# Patient Record
Sex: MALE | Race: WHITE | Employment: OTHER | URBAN - METROPOLITAN AREA
[De-identification: names, ages, dates, MRNs, and addresses within clinical notes are randomized per-mention and may not be internally consistent; named-entity substitution may affect disease eponyms.]

---

## 2017-01-16 DIAGNOSIS — M81.0 OSTEOPOROSIS: Primary | ICD-10-CM

## 2017-01-18 ENCOUNTER — TELEPHONE (OUTPATIENT)
Dept: FAMILY MEDICINE | Facility: CLINIC | Age: 82
End: 2017-01-18

## 2017-01-18 DIAGNOSIS — I10 ESSENTIAL HYPERTENSION WITH GOAL BLOOD PRESSURE LESS THAN 140/90: ICD-10-CM

## 2017-01-18 DIAGNOSIS — G47.00 INSOMNIA, UNSPECIFIED TYPE: ICD-10-CM

## 2017-01-18 DIAGNOSIS — E78.5 HYPERLIPIDEMIA LDL GOAL <130: Primary | ICD-10-CM

## 2017-01-18 RX ORDER — SIMVASTATIN 80 MG
80 TABLET ORAL AT BEDTIME
Qty: 90 TABLET | Refills: 1 | Status: SHIPPED | OUTPATIENT
Start: 2017-01-18 | End: 2017-10-05

## 2017-01-18 RX ORDER — LISINOPRIL 5 MG/1
5 TABLET ORAL DAILY
Qty: 90 TABLET | Refills: 0 | Status: SHIPPED | OUTPATIENT
Start: 2017-01-18 | End: 2017-10-05

## 2017-01-18 RX ORDER — ZOLPIDEM TARTRATE 10 MG/1
10 TABLET ORAL
Qty: 90 TABLET | Refills: 1 | Status: SHIPPED | OUTPATIENT
Start: 2017-01-18 | End: 2017-09-20

## 2017-01-18 RX ORDER — ALENDRONATE SODIUM 70 MG/1
TABLET ORAL
Qty: 12 TABLET | Refills: 3 | Status: SHIPPED | OUTPATIENT
Start: 2017-01-18 | End: 2017-10-05

## 2017-01-18 NOTE — TELEPHONE ENCOUNTER
Routing to provider to review and advise.   All Rx are charted as written request on 11/1/16 with additional refills. Print out given?  Dot Gomez RN

## 2017-01-18 NOTE — TELEPHONE ENCOUNTER
Pending Prescriptions:                       Disp   Refills    simvastatin (ZOCOR) 80 MG tablet          90 tab*3            Sig: Take 1 tablet (80 mg) by mouth At Bedtime    zolpidem (AMBIEN) 10 MG tablet            90 tab*1            Sig: Take 1 tablet (10 mg) by mouth nightly as needed           for sleep    lisinopril (PRINIVIL/ZESTRIL) 5 MG tablet 90 tab*3            Sig: Take 1 tablet (5 mg) by mouth daily    Call these to [facility] he forgot to ask for them  [facility] - [place], [place] - [address]. [Patient Preferred] [phone]

## 2017-01-18 NOTE — TELEPHONE ENCOUNTER
alendronate    Last Written Prescription Date: 11/1/16  Last Fill Quantity: 12, # refills: 3  [place] [facility] [place] [facility] or [facility] prescribing provider: 11/13/15 Dr. Bowles         DEXA Scan:  Last order of DX HIP/PELVIS/SPINE was found on 10/2/2012 from Orders Only on 10/2/2012     No order of DX HIP/PELVIS/SPINE W LAT FRACTION ANALYSIS is found.       CREATININE   Date Value Ref Range Status   11/01/2016 0.90 0.66 - 1.25 mg/dL Final

## 2017-01-18 NOTE — TELEPHONE ENCOUNTER
Routing refill request to provider for review/approval because:  Labs not current:  Last DEXA greater than 2 years ago.  Selene Gatica RN

## 2017-06-21 ENCOUNTER — TELEPHONE (OUTPATIENT)
Dept: SURGERY | Facility: CLINIC | Age: 82
End: 2017-06-21

## 2017-06-21 NOTE — TELEPHONE ENCOUNTER
Name of caller: Patient    Reason for Call:  QUESTIONS    Surgeon:  Dr. Durham    Recent Surgery:  No  Would like 2nd opinion on umbilical hernia.    If yes, when & what type:  N/A      Best phone number to reach pt at is: [phone]  Ok to leave a message with medical info? Yes.    Pharmacy preferred (if calling for a refill): NA

## 2017-06-21 NOTE — TELEPHONE ENCOUNTER
Discussed post op recovery with patient and his wife and surgical options that Dr. Durham does for umbilical hernias. Let patient know that this all depends on the hernia size and patients overall health and recommended a consult to discuss more. Patient verbalized understanding of this and agreed. Encouraged them to call back if they had further questions or concerns.    Ida Griffin RN BSN

## 2017-06-28 ENCOUNTER — OFFICE VISIT (OUTPATIENT)
Dept: SURGERY | Facility: CLINIC | Age: 82
End: 2017-06-28
Payer: MEDICARE

## 2017-06-28 VITALS
BODY MASS INDEX: 23.7 KG/M2 | SYSTOLIC BLOOD PRESSURE: 124 MMHG | DIASTOLIC BLOOD PRESSURE: 60 MMHG | HEIGHT: 69 IN | HEART RATE: 65 BPM | WEIGHT: 160 LBS

## 2017-06-28 DIAGNOSIS — K43.9 VENTRAL HERNIA WITHOUT OBSTRUCTION OR GANGRENE: Primary | ICD-10-CM

## 2017-06-28 PROCEDURE — 99203 OFFICE O/P NEW LOW 30 MIN: CPT | Performed by: SURGERY

## 2017-06-28 RX ORDER — CALCIUM CARBONATE 500(1250)
1 TABLET ORAL 2 TIMES DAILY
COMMUNITY

## 2017-06-28 NOTE — MR AVS SNAPSHOT
"              After Visit Summary   2017    Butch Carter    MRN: [id number]           Patient Information     Date Of Birth          10/9/1933        Visit Information        Provider Department      2017 2:00 PM Hari Durham MD [facility]       Follow-ups after your visit        Who to contact     If you have questions or need follow up information about today's clinic visit or your schedule please contact [facility] directly at [phone].  Normal or non-critical lab and imaging results will be communicated to you by Deminoshart, letter or phone within 4 business days after the clinic has received the results. If you do not hear from us within 7 days, please contact the clinic through [employer] or phone. If you have a critical or abnormal lab result, we will notify you by phone as soon as possible.  Submit refill requests through Table8 or call your pharmacy and they will forward the refill request to us. Please allow 3 business days for your refill to be completed.          Additional Information About Your Visit        MyChart Information     Table8 lets you send messages to your doctor, view your test results, renew your prescriptions, schedule appointments and more. To sign up, go to [email or web address] . Click on \"Log in\" on the left side of the screen, which will take you to the Welcome page. Then click on \"Sign up Now\" on the right side of the page.     You will be asked to enter the access code listed below, as well as some personal information. Please follow the directions to create your username and password.     Your access code is: [email or web address]  Expires: 2017  3:26 PM     Your access code will  in 90 days. If you need help or a new code, please call your [place] clinic or [phone].        Care EveryWhere ID     This is your Care EveryWhere ID. This could be used by other organizations to access " "your [facility] medical records  [id number]        Your Vitals Were     Pulse Height BMI (Body Mass Index)             65 5' 9\" (1.753 m) 23.63 kg/m2          Blood Pressure from Last 3 Encounters:   06/28/17 124/60   11/01/16 111/67   07/13/16 112/63    Weight from Last 3 Encounters:   06/28/17 160 lb (72.6 kg)   11/01/16 157 lb 9.6 oz (71.5 kg)   07/13/16 156 lb (70.8 kg)              Today, you had the following     No orders found for display       Primary Care Provider Office Phone # Fax #    Tate Hsu [phone]       [facility] [address]  [facility] [address]        Equal Access to Services     [facility]: Hadii aad freda hadasho Soomaali, waaxda luqadaha, qaybta kaalmada adeegyada, waxay kaelyn herrera . So [facility] [place] [facility] [phone].    ATENCIÓN: Si habla español, tiene a bhakta disposición servicios gratuitos de asistencia lingüística. Daphne al [phone].    We comply with applicable federal civil rights laws and [place] laws. We do not discriminate on the basis of race, color, national origin, age, disability sex, sexual orientation or gender identity.            Thank you!     Thank you for choosing [facility]  for your care. Our goal is always to provide you with excellent care. Hearing back from our patients is one way we can continue to improve our services. Please take a few minutes to complete the written survey that you may receive in the mail after your visit with us. Thank you!             Your Updated Medication List - Protect others around you: Learn how to safely use, store and throw away your medicines at www.disposemymeds.org.          This list is accurate as of: 6/28/17  3:26 PM.  Always use your most recent med list.                   Brand Name Dispense Instructions for use Diagnosis    * alendronate 70 MG tablet    FOSAMAX    12 tablet    Take 1 tablet (70 mg) by mouth every 7 days    Osteoporosis       * alendronate 70 " MG tablet    FOSAMAX    12 tablet    TAKE 1 TABLET (70MG) BY MOUTH EVERY 7 DAYS    Osteoporosis       aspirin 81 MG tablet     100 tablet    Take 1 tablet (81 mg) by mouth daily *    Hypertension goal BP (blood pressure) < 140/90       B-12 PO           calcium carbonate 1250 MG tablet    OS-SUSHIL 500 mg [place]. [place]     Take 1 tablet by mouth 2 times daily        CENTRUM SILVER ADULT 50+ PO           ferrous sulfate 325 (65 FE) MG tablet    IRON    180 tablet    Take 1 tablet (325 mg) by mouth 2 times daily    Iron deficiency anemia, unspecified iron deficiency anemia type       Fish Oil 1000 MG Cpdr      Take by mouth 2 times daily        GLUCOSAMINE CHONDR 1500 COMPLX PO           lisinopril 5 MG tablet    PRINIVIL/ZESTRIL    90 tablet    Take 1 tablet (5 mg) by mouth daily    Essential hypertension with goal blood pressure less than 140/90       LUTEIN      daily        sildenafil 100 MG cap/tab    REVATIO/VIAGRA    20 tablet    Take 0.5-1 tablets ( mg) by mouth daily as needed for erectile dysfunction Take 30 min to 4 hours before intercourse.  Never use with nitroglycerin, terazosin or doxazosin.    Erectile dysfunction, unspecified erectile dysfunction type       simvastatin 80 MG tablet    ZOCOR    90 tablet    Take 1 tablet (80 mg) by mouth At Bedtime    Hyperlipidemia LDL goal <130       terazosin 5 MG capsule    HYTRIN    90 capsule    Take 1 capsule (5 mg) by mouth At Bedtime    Benign non-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       VITAMIN D (CHOLECALCIFEROL) PO      Take by mouth daily        zolpidem 10 MG tablet    AMBIEN    90 tablet    Take 1 tablet (10 mg) by mouth nightly as needed for sleep    Insomnia, unspecified type       * Notice:  This list has 2 medication(s) that are the same as other medications prescribed for you. Read the directions carefully, and ask your doctor or other care provider to review them with you.

## 2017-06-28 NOTE — LETTER
"2017      RE:  Butch Carter-:  [birth year]    HISTORY OF PRESENT ILLNESS:  Butch Carter is a 83 year old male who is seen in consultation at the request of Dr. Hsu for evaluation of newly developed umbilical hernia.  Mr. Carter has recently started an exercise program and while doing some abdominal crunches, notice a pain in the umbilical region.  Later he noticed a bulge in that area, there was some pain, he was able to reduce the hernia and felt better.  He has back down from his usual physical activities as this hernia provides enough discomfort to alter his behavior.  He is here today with his wife to discuss repair.     REVIEW OF SYSTEMS:  Constitutional:  Negative for chills, fatigue, fever and weight change.  Eyes:  Negative for blurred vision.  ENT:  Negative for ENT pain.  Cardiovascular:  Negative for chest pain, palpitations.  Respiratory:  Negative for cough, dyspnea and hemoptysis.  Gastrointestinal:  See HPI.  No GI function changes or alterations.  Musculoskeletal:  Negative for arthralgias, back pain and myalgias.     Vitals: /60  Pulse 65  Ht 5' 9\" (1.753 m)  Wt 160 lb (72.6 kg)  BMI 23.63 kg/m2  BMI= Body mass index is 23.63 kg/(m^2).     EXAM:  GENERAL: healthy, alert and no distress   HEENT: moist mucus membranes, no scleral icterus  CARDIOVASCULAR:  RRR, No JVD  RESPIRATORY: non labored breathing  NECK: Neck supple. No noticeable masses.  ABDOMEN: soft, nontender, nondistended, reducible ventral hernia at umbilicus.  Extremities: warm and well perfused, no edema  SKIN: No suspicious lesions or rashes     LABS/Imaging: none to review     ASSESSMENT:  Butch Carter suffers from Umbilical ventral hernia.     PLAN:  Open repair with mesh under intravenous sedation as outpatient.     Butch Carter understands the risk, benefits, hopeful outcomes, and possible complications, both in the short and in the long term.  All his questions answered, he will like to proceed with the propose " procedure in the near future.     It is my pleasure to participate in the care of Butch Carter. Thank you for this consultation.      If you have any questions please give me a call.     Best regards,    Hari Durham MD

## 2017-06-30 NOTE — PROGRESS NOTES
[facility] Consultation    CHIEF COMPLAINT:  Chief Complaint   Patient presents with     Consult     umbilical hernia       HISTORY OF PRESENT ILLNESS:  Butch Carter is a 83 year old male who is seen in consultation at the request of Dr. Hsu for evaluation of newly developed umbilical hernia.  Mr. Carter has recently started an exercise program and while doing some abdominal crunches, notice a pain in the umbilical region.  Later he noticed a bulge in that area, there was some pain, he was able to reduce the hernia and felt better.  He has back down from his usual physical activities as this hernia provides enough discomfort to alter his behavior.  He is here today with his wife to discuss repair.    REVIEW OF SYSTEMS:  Constitutional:  Negative for chills, fatigue, fever and weight change.  Eyes:  Negative for blurred vision.  ENT:  Negative for ENT pain.  Cardiovascular:  Negative for chest pain, palpitations.  Respiratory:  Negative for cough, dyspnea and hemoptysis.  Gastrointestinal:  See HPI.  No GI function changes or alterations.  Musculoskeletal:  Negative for arthralgias, back pain and myalgias.    Past Medical History:   Diagnosis Date     Hyperlipidemia LDL goal <130 9/21/2011     Hypertension goal BP (blood pressure) < 140/90 9/21/2011       Past Surgical History:   Procedure Laterality Date     SURGICAL HISTORY OF -   5/04    Double Hernia     SURGICAL HISTORY OF -   2013    cataract OD       Family History   Problem Relation Age of Onset     CANCER Mother      Hypertension Mother        Social History   Substance Use Topics     Smoking status: Never Smoker     Smokeless tobacco: Never Used     Alcohol use No       Patient Active Problem List   Diagnosis     Hyperlipidemia LDL goal <130     Osteoporosis     Advanced directives, counseling/discussion     ED (erectile dysfunction)     Benign non-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     Iron deficiency  "anemia     Insomnia, unspecified type     Essential hypertension with goal blood pressure less than 140/90     Erectile dysfunction, unspecified erectile dysfunction type       No Known Allergies    Current Outpatient Prescriptions   Medication Sig Dispense Refill     Cyanocobalamin (B-12 PO)        VITAMIN D, CHOLECALCIFEROL, PO Take by mouth daily       calcium carbonate (OS-SUSHIL 500 MG [place]. [place]) 1250 MG tablet Take 1 tablet by mouth 2 times daily       alendronate (FOSAMAX) 70 MG tablet TAKE 1 TABLET (70MG) BY MOUTH EVERY 7 DAYS 12 tablet 3     simvastatin (ZOCOR) 80 MG tablet Take 1 tablet (80 mg) by mouth At Bedtime 90 tablet 1     zolpidem (AMBIEN) 10 MG tablet Take 1 tablet (10 mg) by mouth nightly as needed for sleep 90 tablet 1     ferrous sulfate (IRON) 325 (65 FE) MG tablet Take 1 tablet (325 mg) by mouth 2 times daily 180 tablet 3     terazosin (HYTRIN) 5 MG capsule Take 1 capsule (5 mg) by mouth At Bedtime 90 capsule 3     alendronate (FOSAMAX) 70 MG tablet Take 1 tablet (70 mg) by mouth every 7 days 12 tablet 3     sildenafil (VIAGRA) 100 MG tablet Take 0.5-1 tablets ( mg) by mouth daily as needed for erectile dysfunction Take 30 min to 4 hours before intercourse.  Never use with nitroglycerin, terazosin or doxazosin. 20 tablet 5     aspirin 81 MG tablet Take 1 tablet (81 mg) by mouth daily * 100 tablet 1     Multiple Vitamins-Minerals (CENTRUM SILVER ADULT 50+ PO)        Glucosamine-Chondroit-Vit C-Mn (GLUCOSAMINE CHONDR 1500 COMPLX PO)        Omega-3 Fatty Acids (FISH OIL) 1000 MG CPDR Take by mouth 2 times daily       LUTEIN daily       lisinopril (PRINIVIL/ZESTRIL) 5 MG tablet Take 1 tablet (5 mg) by mouth daily (Patient not taking: Reported on 6/28/2017) 90 tablet 0       Vitals: /60  Pulse 65  Ht 5' 9\" (1.753 m)  Wt 160 lb (72.6 kg)  BMI 23.63 kg/m2  BMI= Body mass index is 23.63 kg/(m^2).    EXAM:  GENERAL: healthy, alert and no distress   HEENT: moist mucus membranes, no scleral " icterus  CARDIOVASCULAR:  RRR, No JVD  RESPIRATORY: non labored breathing  NECK: Neck supple. No noticeable masses.  ABDOMEN: soft, nontender, nondistended, reducible ventral hernia at umbilicus.  Extremities: warm and well perfused, no edema  SKIN: No suspicious lesions or rashes    LABS/Imaging: none to review    ASSESSMENT:  Butch Carter suffers from Umbilical ventral hernia.    PLAN:  Open repair with mesh under intravenous sedation as outpatient.    Butch Carter understands the risk, benefits, hopeful outcomes, and possible complications, both in the short and in the long term.  All his questions answered, he will like to proceed with the propose procedure in the near future.    It is my pleasure to participate in the care of Butch Carter. Thank you for this consultation.     If you have any questions please give me a call.    Best regards,  Hari Durham MD    Please route or send letter to:  Primary Care Provider (PCP), Referring Provider and Include Progress Note    Total time with patient visit: 30 minutes more than half spent in counseling, explanation of procedures and coordination of care.

## 2017-07-18 ENCOUNTER — OFFICE VISIT (OUTPATIENT)
Dept: FAMILY MEDICINE | Facility: CLINIC | Age: 82
End: 2017-07-18
Payer: MEDICARE

## 2017-07-18 VITALS
TEMPERATURE: 97.2 F | HEART RATE: 75 BPM | HEIGHT: 69 IN | OXYGEN SATURATION: 95 % | SYSTOLIC BLOOD PRESSURE: 108 MMHG | BODY MASS INDEX: 23.99 KG/M2 | WEIGHT: 162 LBS | DIASTOLIC BLOOD PRESSURE: 63 MMHG

## 2017-07-18 DIAGNOSIS — Z01.818 PREOP GENERAL PHYSICAL EXAM: Primary | ICD-10-CM

## 2017-07-18 LAB
ANION GAP SERPL CALCULATED.3IONS-SCNC: 4 MMOL/L (ref 3–14)
BUN SERPL-MCNC: 16 MG/DL (ref 7–30)
CALCIUM SERPL-MCNC: 8.6 MG/DL (ref 8.5–10.1)
CHLORIDE SERPL-SCNC: 106 MMOL/L (ref 94–109)
CO2 SERPL-SCNC: 30 MMOL/L (ref 20–32)
CREAT SERPL-MCNC: 0.81 MG/DL (ref 0.66–1.25)
ERYTHROCYTE [DISTWIDTH] IN BLOOD BY AUTOMATED COUNT: 15 % (ref 10–15)
GFR SERPL CREATININE-BSD FRML MDRD: NORMAL ML/MIN/1.7M2
GLUCOSE SERPL-MCNC: 96 MG/DL (ref 70–99)
HCT VFR BLD AUTO: 36.1 % (ref 40–53)
HGB BLD-MCNC: 11.8 G/DL (ref 13.3–17.7)
MCH RBC QN AUTO: 30.6 PG (ref 26.5–33)
MCHC RBC AUTO-ENTMCNC: 32.7 G/DL (ref 31.5–36.5)
MCV RBC AUTO: 94 FL (ref 78–100)
PLATELET # BLD AUTO: 189 10E9/L (ref 150–450)
POTASSIUM SERPL-SCNC: 4.1 MMOL/L (ref 3.4–5.3)
RBC # BLD AUTO: 3.85 10E12/L (ref 4.4–5.9)
SODIUM SERPL-SCNC: 140 MMOL/L (ref 133–144)
WBC # BLD AUTO: 6.4 10E9/L (ref 4–11)

## 2017-07-18 PROCEDURE — 99214 OFFICE O/P EST MOD 30 MIN: CPT | Performed by: FAMILY MEDICINE

## 2017-07-18 PROCEDURE — 93000 ELECTROCARDIOGRAM COMPLETE: CPT | Performed by: FAMILY MEDICINE

## 2017-07-18 PROCEDURE — 85027 COMPLETE CBC AUTOMATED: CPT | Performed by: FAMILY MEDICINE

## 2017-07-18 PROCEDURE — 36415 COLL VENOUS BLD VENIPUNCTURE: CPT | Performed by: FAMILY MEDICINE

## 2017-07-18 PROCEDURE — 80048 BASIC METABOLIC PNL TOTAL CA: CPT | Performed by: FAMILY MEDICINE

## 2017-07-18 NOTE — NURSING NOTE
"Chief Complaint   Patient presents with     Pre-Op Exam       Initial /63 (BP Location: Left arm, Patient Position: Chair, Cuff Size: Adult Regular)  Pulse 75  Temp 97.2  F (36.2  C) (Oral)  Ht 5' 9\" (1.753 m)  Wt 162 lb (73.5 kg)  SpO2 95%  BMI 23.92 kg/m2 Estimated body mass index is 23.92 kg/(m^2) as calculated from the following:    Height as of this encounter: 5' 9\" (1.753 m).    Weight as of this encounter: 162 lb (73.5 kg).  Medication Reconciliation: complete     Nettie Coleman MA      "

## 2017-07-18 NOTE — MR AVS SNAPSHOT
After Visit Summary   7/18/2017    Butch Carter    MRN: [id number]           Patient Information     Date Of Birth          10/9/1933        Visit Information        Provider Department      7/18/2017 10:40 AM Tate Hsu MD [facility]        Today's Diagnoses     Preop general physical exam    -  1      Care Instructions      Before Your Surgery      Call your surgeon if there is any change in your health. This includes signs of a cold or flu (such as a sore throat, runny nose, cough, rash or fever).    Do not smoke, drink alcohol or take over the counter medicine (unless your surgeon or primary care doctor tells you to) for the 24 hours before and after surgery.    If you take prescribed drugs: Follow your doctor s orders about which medicines to take and which to stop until after surgery.    Eating and drinking prior to surgery: follow the instructions from your surgeon    Take a shower or bath the night before surgery. Use the soap your surgeon gave you to gently clean your skin. If you do not have soap from your surgeon, use your regular soap. Do not shave or scrub the surgery site.  Wear clean pajamas and have clean sheets on your bed.     How to contact your care team providers:    [facility]  [phone]  Pharmacy [phone]    MITZI Sol[address] Dr., Dr., Dr., PA-C    Team RN: Leeroy GREENWOOD    Clinic hours  M-Th 7am-7pm   Fri 7am-5pm.   Urgent care M-F 11am-9pm,   Sat/sun 9am-5pm.  Pharmacy M-F 8:00am-8pm Sat/sun 9am-5pm.     All password changes, disabled accounts, or ID changes in [employer]/MyHealth will be done by our Access Services Department.   If you need help with your account or password, call: [phone]. Clinic staff no longer has the ability to change passwords.                         Follow-ups after your visit        Your next 10 appointments already  "scheduled     Aug 02, 2017   Procedure with Hari Durham MD   [facility] (--)    [address]  [place] [address] [phone]            Aug 02, 2017  9:00 AM CDT   [facility] Same Day Surgery with Hari Durham MD   [facility] ([facility])    [facility] ([facility])   [phone]            Aug 16, 2017 12:00 PM CDT   Post Op with Hari Durham MD   [facility] [place] ([facility] [place])    [address]  [place] [address] [phone]              Who to contact     If you have questions or need follow up information about today's clinic visit or your schedule please contact [facility] directly at [phone].  Normal or non-critical lab and imaging results will be communicated to you by [employer], letter or phone within 4 business days after the clinic has received the results. If you do not hear from us within 7 days, please contact the clinic through [employer] or phone. If you have a critical or abnormal lab result, we will notify you by phone as soon as possible.  Submit refill requests through [employer] or call your pharmacy and they will forward the refill request to us. Please allow 3 business days for your refill to be completed.          Additional Information About Your Visit        [employer] Information     [employer] lets you send messages to your doctor, view your test results, renew your prescriptions, schedule appointments and more. To sign up, go to [email or web address] . Click on \"Log in\" on the left side of the screen, which will take you to the Welcome page. Then click on \"Sign up Now\" on the right side of the page.     You will be asked to enter the access code listed below, as well as some personal information. Please follow the directions to create your username and password.     Your access code is: " [email or web address]  Expires: 2017  3:26 PM     Your access code will  in 90 days. If you need help or a new code, please call your [facility]3] or [phone].        Care EveryWhere ID     This is your Care EveryWhere ID. This could be used by other organizations to access your [facility] medical records  [id number]        Your Vitals Were     Pulse Temperature Height Pulse Oximetry BMI (Body Mass Index)       75 97.2  F (36.2  C) (Oral) 5' 9\" (1.753 m) 95% 23.92 kg/m2        Blood Pressure from Last 3 Encounters:   17 108/63   17 124/60   16 111/67    Weight from Last 3 Encounters:   17 162 lb (73.5 kg)   17 160 lb (72.6 kg)   16 157 lb 9.6 oz (71.5 kg)              We Performed the Following     Basic metabolic panel     CBC with platelets     EKG 12-lead complete w/read - Clinics        Primary Care Provider Office Phone # Fax #    Tate Hsu [phone]       [facility] [address]  [facility] [address]        Equal Access to Services     KARIN GIBBS : Hadii aad ku hadasho Soomaali, waaxda luqadaha, qaybta kaalmada adeegyada, waxay idiin hayerynn hetal alberto laron . So [facility] [phone].    ATENCIÓN: Si habla español, tiene a bhakta disposición servicios gratuitos de asistencia lingüística. Llame al [phone].    We comply with applicable federal civil rights laws and [place] laws. We do not discriminate on the basis of race, color, national origin, age, disability sex, sexual orientation or gender identity.            Thank you!     Thank you for choosing [facility]  for your care. Our goal is always to provide you with excellent care. Hearing back from our patients is one way we can continue to improve our services. Please take a few minutes to complete the written survey that you may receive in the mail after your visit with us. Thank you!             Your Updated Medication List - Protect others around " you: Learn how to safely use, store and throw away your medicines at www.disposemymeds.org.          This list is accurate as of: 7/18/17 11:06 AM.  Always use your most recent med list.                   Brand Name Dispense Instructions for use Diagnosis    alendronate 70 MG tablet    FOSAMAX    12 tablet    TAKE 1 TABLET (70MG) BY MOUTH EVERY 7 DAYS    Osteoporosis       aspirin 81 MG tablet     100 tablet    Take 1 tablet (81 mg) by mouth daily *    Hypertension goal BP (blood pressure) < 140/90       B-12 PO           calcium carbonate 1250 MG tablet    OS-SUSHIL 500 mg [place]. Ca     Take 1 tablet by mouth 2 times daily        CENTRUM SILVER ADULT 50+ PO           ferrous sulfate 325 (65 FE) MG tablet    IRON    180 tablet    Take 1 tablet (325 mg) by mouth 2 times daily    Iron deficiency anemia, unspecified iron deficiency anemia type       Fish Oil 1000 MG Cpdr      Take by mouth 2 times daily        GLUCOSAMINE CHONDR 1500 COMPLX PO           lisinopril 5 MG tablet    PRINIVIL/ZESTRIL    90 tablet    Take 1 tablet (5 mg) by mouth daily    Essential hypertension with goal blood pressure less than 140/90       LUTEIN      daily        sildenafil 100 MG cap/tab    REVATIO/VIAGRA    20 tablet    Take 0.5-1 tablets ( mg) by mouth daily as needed for erectile dysfunction Take 30 min to 4 hours before intercourse.  Never use with nitroglycerin, terazosin or doxazosin.    Erectile dysfunction, unspecified erectile dysfunction type       simvastatin 80 MG tablet    ZOCOR    90 tablet    Take 1 tablet (80 mg) by mouth At Bedtime    Hyperlipidemia LDL goal <130       terazosin 5 MG capsule    HYTRIN    90 capsule    Take 1 capsule (5 mg) by mouth At Bedtime    Benign non-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       VITAMIN D (CHOLECALCIFEROL) PO      Take by mouth daily        zolpidem 10 MG tablet    AMBIEN    90 tablet    Take 1 tablet (10 mg) by mouth nightly as needed for sleep     Insomnia, unspecified type

## 2017-07-18 NOTE — PROGRESS NOTES
[address] [address]  [phone]  Dept: [phone]    PRE-OP EVALUATION:  Today's date: 2017    Butch Carter (: 10/9/1933) presents for pre-operative evaluation assessment as requested by Dr. Durham.  He requires evaluation and anesthesia risk assessment prior to undergoing surgery/procedure for treatment of umbilical hernia.  Proposed procedure: HERNIORRHAPHY VENTRAL    Date of Surgery/ Procedure: 17  Time of Surgery/ Procedure: 9 am  H[facility]Surgical [facility]: [facility]  Fax number for surgical facility: does not have  Primary Physician: Tate Hsu  Type of Anesthesia Anticipated: General    Patient has a Health Care Directive or Living Will:  NO    1. NO - Do you have a history of heart attack, stroke, stent, bypass or surgery on an artery in the head, neck, heart or legs?  2. NO - Do you ever have any pain or discomfort in your chest?  3. NO - Do you have a history of  Heart Failure?  4. NO - Are you troubled by shortness of breath when: walking on the level, up a slight hill or at night?  5. NO - Do you currently have a cold, bronchitis or other respiratory infection?  6. NO - Do you have a cough, shortness of breath or wheezing?  7. NO - Do you sometimes get pains in the calves of your legs when you walk?  8. NO - Do you or anyone in your family have previous history of blood clots?  9. NO - Do you or does anyone in your family have a serious bleeding problem such as prolonged bleeding following surgeries or cuts?  10. YES - HAVE YOU EVER HAD PROBLEMS WITH ANEMIA OR BEEN TOLD TO TAKE IRON PILLS?   11. NO - Have you had any abnormal blood loss such as black, tarry or bloody stools, or abnormal vaginal bleeding?  12. NO - Have you ever had a blood transfusion?  13. NO - Have you or any of your relatives ever had problems with anesthesia?  14. NO - Do you have sleep apnea, excessive snoring or daytime drowsiness?  15. NO - Do  you have any prosthetic heart valves?  16. NO - Do you have prosthetic joints?  17. NO - Is there any chance that you may be pregnant?      HPI:                                                      Brief HPI related to upcoming procedure: h/o umbilical hernia with some discomfort. Patient would like to proceed with procedure.      See problem list for active medical problems.  Problems all longstanding and stable, except as noted/documented.  See ROS for pertinent symptoms related to these conditions.                                                                                                  .    MEDICAL HISTORY:                                                      Patient Active Problem List    Diagnosis Date Noted     Erectile dysfunction, unspecified erectile dysfunction type 11/01/2016     Priority: Medium     Essential hypertension with goal blood pressure less than 140/90 07/13/2016     Priority: Medium     Insomnia, unspecified type 06/01/2016     Priority: Medium     Benign non-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 11/13/2015     Priority: Medium     Iron deficiency anemia 11/13/2015     Priority: Medium     ED (erectile dysfunction) 05/17/2013     Priority: Medium     Advanced directives, counseling/discussion 09/25/2012     Priority: Medium     Discussed advance care planning with patient; information given to patient to review. 9/25/2012     ZAYNAB Goodman MA           Hyperlipidemia LDL goal <130 09/21/2011     Priority: Medium     Osteoporosis 09/21/2011     Priority: Medium      Past Medical History:   Diagnosis Date     Hyperlipidemia LDL goal <130 9/21/2011     Hypertension goal BP (blood pressure) < 140/90 9/21/2011     Past Surgical History:   Procedure Laterality Date     SURGICAL HISTORY OF -   5/04    Double Hernia     SURGICAL HISTORY OF -   2013    cataract OD     Current Outpatient Prescriptions   Medication Sig Dispense Refill     Cyanocobalamin (B-12 PO)         VITAMIN D, CHOLECALCIFEROL, PO Take by mouth daily       calcium carbonate (OS-SUSHIL 500 MG [place]. CA) 1250 MG tablet Take 1 tablet by mouth 2 times daily       alendronate (FOSAMAX) 70 MG tablet TAKE 1 TABLET (70MG) BY MOUTH EVERY 7 DAYS 12 tablet 3     simvastatin (ZOCOR) 80 MG tablet Take 1 tablet (80 mg) by mouth At Bedtime 90 tablet 1     zolpidem (AMBIEN) 10 MG tablet Take 1 tablet (10 mg) by mouth nightly as needed for sleep 90 tablet 1     lisinopril (PRINIVIL/ZESTRIL) 5 MG tablet Take 1 tablet (5 mg) by mouth daily 90 tablet 0     ferrous sulfate (IRON) 325 (65 FE) MG tablet Take 1 tablet (325 mg) by mouth 2 times daily 180 tablet 3     terazosin (HYTRIN) 5 MG capsule Take 1 capsule (5 mg) by mouth At Bedtime 90 capsule 3     sildenafil (VIAGRA) 100 MG tablet Take 0.5-1 tablets ( mg) by mouth daily as needed for erectile dysfunction Take 30 min to 4 hours before intercourse.  Never use with nitroglycerin, terazosin or doxazosin. 20 tablet 5     aspirin 81 MG tablet Take 1 tablet (81 mg) by mouth daily * 100 tablet 1     Multiple Vitamins-Minerals (CENTRUM SILVER ADULT 50+ PO)        Glucosamine-Chondroit-Vit C-Mn (GLUCOSAMINE CHONDR 1500 COMPLX PO)        Omega-3 Fatty Acids (FISH OIL) 1000 MG CPDR Take by mouth 2 times daily       LUTEIN daily       [DISCONTINUED] alendronate (FOSAMAX) 70 MG tablet Take 1 tablet (70 mg) by mouth every 7 days 12 tablet 3     OTC products: Aspirin    No Known Allergies   Latex Allergy: NO    Social History   Substance Use Topics     Smoking status: Never Smoker     Smokeless tobacco: Never Used     Alcohol use No     History   Drug Use No       REVIEW OF SYSTEMS:                                                    C: NEGATIVE for fever, chills, change in weight  E/M: NEGATIVE for ear, mouth and throat problems  R: NEGATIVE for significant cough or SOB  CV: NEGATIVE for chest pain, palpitations or peripheral edema    EXAM:                                                   "  /63 (BP Location: Left arm, Patient Position: Chair, Cuff Size: Adult Regular)  Pulse 75  Temp 97.2  F (36.2  C) (Oral)  Ht 5' 9\" (1.753 m)  Wt 162 lb (73.5 kg)  SpO2 95%  BMI 23.92 kg/m2  GENERAL APPEARANCE: healthy, alert and no distress  HENT: ear canals and TM's normal and nose and mouth without ulcers or lesions  RESP: lungs clear to auscultation - no rales, rhonchi or wheezes  CV: regular rate and rhythm, normal S1 S2, no S3 or S4 and no murmur, click or rub   ABDOMEN: soft, nontender, no HSM or masses and bowel sounds normal  NEURO: Normal strength and tone, sensory exam grossly normal, mentation intact and speech normal    DIAGNOSTICS:                                                    EKG: appears normal, NSR, normal axis, normal intervals, no acute ST/T changes c/w ischemia, no LVH by voltage criteria, unchanged from previous tracings    Recent Labs   Lab Test  11/01/16   0741  07/13/16   0738  06/01/16   0908   HGB  12.1*  11.9*  11.9*   PLT  198  177  180   NA  142   --   141   POTASSIUM  4.1   --   4.3   CR  0.90   --   0.85        IMPRESSION:                                                    Reason for surgery/procedure: ventral hernia repair  Diagnosis/reason for consult: preop clearance    The proposed surgical procedure is considered LOW risk.    REVISED CARDIAC RISK INDEX  The patient has the following serious cardiovascular risks for perioperative complications such as (MI, PE, VFib and 3  AV Block):  No serious cardiac risks  INTERPRETATION: 0 risks: Class I (very low risk - 0.4% complication rate)    The patient has the following additional risks for perioperative complications:  No identified additional risks    No diagnosis found.    RECOMMENDATIONS:                                                      --Patient is to take all scheduled medications on the day of surgery EXCEPT for modifications listed below.    Anticoagulant or Antiplatelet Medication Use  ASPIRIN: Discontinue " ASA 7-10 days prior to procedure to reduce bleeding risk.  It should be resumed post-operatively.        APPROVAL GIVEN to proceed with proposed procedure, without further diagnostic evaluation       Signed Electronically by: Tate Hsu MD, MD    Copy of this evaluation report is provided to requesting physician.    [place] Preop Guidelines

## 2017-07-28 NOTE — H&P (VIEW-ONLY)
[address] [address]  [phone]  Dept: [phone]    PRE-OP EVALUATION:  Today's date: 2017    Butch Carter (: 10/9/1933) presents for pre-operative evaluation assessment as requested by Dr. Durham.  He requires evaluation and anesthesia risk assessment prior to undergoing surgery/procedure for treatment of umbilical hernia.  Proposed procedure: HERNIORRHAPHY VENTRAL    Date of Surgery/ Procedure: 17  Time of Surgery/ Procedure: 9 am  Hospital/Surgical Facility: [facility]  Fax number for surgical facility: does not have  Primary Physician: Tate Hsu  Type of Anesthesia Anticipated: General    Patient has a Health Care Directive or Living Will:  NO    1. NO - Do you have a history of heart attack, stroke, stent, bypass or surgery on an artery in the head, neck, heart or legs?  2. NO - Do you ever have any pain or discomfort in your chest?  3. NO - Do you have a history of  Heart Failure?  4. NO - Are you troubled by shortness of breath when: walking on the level, up a slight hill or at night?  5. NO - Do you currently have a cold, bronchitis or other respiratory infection?  6. NO - Do you have a cough, shortness of breath or wheezing?  7. NO - Do you sometimes get pains in the calves of your legs when you walk?  8. NO - Do you or anyone in your family have previous history of blood clots?  9. NO - Do you or does anyone in your family have a serious bleeding problem such as prolonged bleeding following surgeries or cuts?  10. YES - HAVE YOU EVER HAD PROBLEMS WITH ANEMIA OR BEEN TOLD TO TAKE IRON PILLS?   11. NO - Have you had any abnormal blood loss such as black, tarry or bloody stools, or abnormal vaginal bleeding?  12. NO - Have you ever had a blood transfusion?  13. NO - Have you or any of your relatives ever had problems with anesthesia?  14. NO - Do you have sleep apnea, excessive snoring or daytime drowsiness?  15. NO - Do  you have any prosthetic heart valves?  16. NO - Do you have prosthetic joints?  17. NO - Is there any chance that you may be pregnant?      HPI:                                                      Brief HPI related to upcoming procedure: h/o umbilical hernia with some discomfort. Patient would like to proceed with procedure.      See problem list for active medical problems.  Problems all longstanding and stable, except as noted/documented.  See ROS for pertinent symptoms related to these conditions.                                                                                                  .    MEDICAL HISTORY:                                                      Patient Active Problem List    Diagnosis Date Noted     Erectile dysfunction, unspecified erectile dysfunction type 11/01/2016     Priority: Medium     Essential hypertension with goal blood pressure less than 140/90 07/13/2016     Priority: Medium     Insomnia, unspecified type 06/01/2016     Priority: Medium     Benign non-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 11/13/2015     Priority: Medium     Iron deficiency anemia 11/13/2015     Priority: Medium     ED (erectile dysfunction) 05/17/2013     Priority: Medium     Advanced directives, counseling/discussion 09/25/2012     Priority: Medium     Discussed advance care planning with patient; information given to patient to review. 9/25/2012     ZAYNAB Goodman MA           Hyperlipidemia LDL goal <130 09/21/2011     Priority: Medium     Osteoporosis 09/21/2011     Priority: Medium      Past Medical History:   Diagnosis Date     Hyperlipidemia LDL goal <130 9/21/2011     Hypertension goal BP (blood pressure) < 140/90 9/21/2011     Past Surgical History:   Procedure Laterality Date     SURGICAL HISTORY OF -   5/04    Double Hernia     SURGICAL HISTORY OF -   2013    cataract OD     Current Outpatient Prescriptions   Medication Sig Dispense Refill     Cyanocobalamin (B-12 PO)         VITAMIN D, CHOLECALCIFEROL, PO Take by mouth daily       calcium carbonate (OS-SUSHIL 500 MG [place]) 1250 MG tablet Take 1 tablet by mouth 2 times daily       alendronate (FOSAMAX) 70 MG tablet TAKE 1 TABLET (70MG) BY MOUTH EVERY 7 DAYS 12 tablet 3     simvastatin (ZOCOR) 80 MG tablet Take 1 tablet (80 mg) by mouth At Bedtime 90 tablet 1     zolpidem (AMBIEN) 10 MG tablet Take 1 tablet (10 mg) by mouth nightly as needed for sleep 90 tablet 1     lisinopril (PRINIVIL/ZESTRIL) 5 MG tablet Take 1 tablet (5 mg) by mouth daily 90 tablet 0     ferrous sulfate (IRON) 325 (65 FE) MG tablet Take 1 tablet (325 mg) by mouth 2 times daily 180 tablet 3     terazosin (HYTRIN) 5 MG capsule Take 1 capsule (5 mg) by mouth At Bedtime 90 capsule 3     sildenafil (VIAGRA) 100 MG tablet Take 0.5-1 tablets ( mg) by mouth daily as needed for erectile dysfunction Take 30 min to 4 hours before intercourse.  Never use with nitroglycerin, terazosin or doxazosin. 20 tablet 5     aspirin 81 MG tablet Take 1 tablet (81 mg) by mouth daily * 100 tablet 1     Multiple Vitamins-Minerals (CENTRUM SILVER ADULT 50+ PO)        Glucosamine-Chondroit-Vit C-Mn (GLUCOSAMINE CHONDR 1500 COMPLX PO)        Omega-3 Fatty Acids (FISH OIL) 1000 MG CPDR Take by mouth 2 times daily       LUTEIN daily       [DISCONTINUED] alendronate (FOSAMAX) 70 MG tablet Take 1 tablet (70 mg) by mouth every 7 days 12 tablet 3     OTC products: Aspirin    No Known Allergies   Latex Allergy: NO    Social History   Substance Use Topics     Smoking status: Never Smoker     Smokeless tobacco: Never Used     Alcohol use No     History   Drug Use No       REVIEW OF SYSTEMS:                                                    C: NEGATIVE for fever, chills, change in weight  E/M: NEGATIVE for ear, mouth and throat problems  R: NEGATIVE for significant cough or SOB  CV: NEGATIVE for chest pain, palpitations or peripheral edema    EXAM:                                                   "  /63 (BP Location: Left arm, Patient Position: Chair, Cuff Size: Adult Regular)  Pulse 75  Temp 97.2  F (36.2  C) (Oral)  Ht 5' 9\" (1.753 m)  Wt 162 lb (73.5 kg)  SpO2 95%  BMI 23.92 kg/m2  GENERAL APPEARANCE: healthy, alert and no distress  HENT: ear canals and TM's normal and nose and mouth without ulcers or lesions  RESP: lungs clear to auscultation - no rales, rhonchi or wheezes  CV: regular rate and rhythm, normal S1 S2, no S3 or S4 and no murmur, click or rub   ABDOMEN: soft, nontender, no HSM or masses and bowel sounds normal  NEURO: Normal strength and tone, sensory exam grossly normal, mentation intact and speech normal    DIAGNOSTICS:                                                    EKG: appears normal, NSR, normal axis, normal intervals, no acute ST/T changes c/w ischemia, no LVH by voltage criteria, unchanged from previous tracings    Recent Labs   Lab Test  11/01/16   0741  07/13/16   0738  06/01/16   0908   HGB  12.1*  11.9*  11.9*   PLT  198  177  180   NA  142   --   141   POTASSIUM  4.1   --   4.3   CR  0.90   --   0.85        IMPRESSION:                                                    Reason for surgery/procedure: ventral hernia repair  Diagnosis/reason for consult: preop clearance    The proposed surgical procedure is considered LOW risk.    REVISED CARDIAC RISK INDEX  The patient has the following serious cardiovascular risks for perioperative complications such as (MI, PE, VFib and 3  AV Block):  No serious cardiac risks  INTERPRETATION: 0 risks: Class I (very low risk - 0.4% complication rate)    The patient has the following additional risks for perioperative complications:  No identified additional risks    No diagnosis found.    RECOMMENDATIONS:                                                      --Patient is to take all scheduled medications on the day of surgery EXCEPT for modifications listed below.    Anticoagulant or Antiplatelet Medication Use  ASPIRIN: Discontinue " ASA 7-10 days prior to procedure to reduce bleeding risk.  It should be resumed post-operatively.        APPROVAL GIVEN to proceed with proposed procedure, without further diagnostic evaluation       Signed Electronically by: Tate Hsu MD, MD    Copy of this evaluation report is provided to requesting physician.    [facility] Preop Guidelines

## 2017-08-02 ENCOUNTER — APPOINTMENT (OUTPATIENT)
Dept: SURGERY | Facility: PHYSICIAN GROUP | Age: 82
End: 2017-08-02
Payer: MEDICARE

## 2017-08-02 ENCOUNTER — HOSPITAL ENCOUNTER (OUTPATIENT)
Facility: CLINIC | Age: 82
Discharge: HOME | End: 2017-08-02
Attending: SURGERY | Admitting: SURGERY
Payer: MEDICARE

## 2017-08-02 ENCOUNTER — ANESTHESIA (OUTPATIENT)
Dept: SURGERY | Facility: CLINIC | Age: 82
End: 2017-08-02
Payer: MEDICARE

## 2017-08-02 ENCOUNTER — ANESTHESIA EVENT (OUTPATIENT)
Dept: SURGERY | Facility: CLINIC | Age: 82
End: 2017-08-02
Payer: MEDICARE

## 2017-08-02 ENCOUNTER — SURGERY (OUTPATIENT)
Age: 82
End: 2017-08-02

## 2017-08-02 VITALS
SYSTOLIC BLOOD PRESSURE: 128 MMHG | DIASTOLIC BLOOD PRESSURE: 72 MMHG | BODY MASS INDEX: 23.92 KG/M2 | WEIGHT: 162 LBS | TEMPERATURE: 96.7 F | OXYGEN SATURATION: 98 % | RESPIRATION RATE: 16 BRPM

## 2017-08-02 DIAGNOSIS — G89.18 ACUTE POST-OPERATIVE PAIN: Primary | ICD-10-CM

## 2017-08-02 PROCEDURE — 49560 ZZHC REPAIR INCISIONAL HERNIA,REDUCIBLE: CPT | Performed by: SURGERY

## 2017-08-02 PROCEDURE — 40000170 ZZH STATISTIC PRE-PROCEDURE ASSESSMENT II: Performed by: SURGERY

## 2017-08-02 PROCEDURE — C1781 MESH (IMPLANTABLE): HCPCS | Performed by: SURGERY

## 2017-08-02 PROCEDURE — 36000058 ZZH SURGERY LEVEL 3 EA 15 ADDTL MIN: Performed by: SURGERY

## 2017-08-02 PROCEDURE — 25000128 H RX IP 250 OP 636: Performed by: NURSE ANESTHETIST, CERTIFIED REGISTERED

## 2017-08-02 PROCEDURE — 25000125 ZZHC RX 250: Performed by: NURSE ANESTHETIST, CERTIFIED REGISTERED

## 2017-08-02 PROCEDURE — A9270 NON-COVERED ITEM OR SERVICE: HCPCS | Mod: GY | Performed by: SURGERY

## 2017-08-02 PROCEDURE — 71000012 ZZH RECOVERY PHASE 1 LEVEL 1 FIRST HR: Performed by: SURGERY

## 2017-08-02 PROCEDURE — 36000056 ZZH SURGERY LEVEL 3 1ST 30 MIN: Performed by: SURGERY

## 2017-08-02 PROCEDURE — 49560 ZZHC REPAIR INCISIONAL HERNIA,REDUCIBLE: CPT | Mod: AS | Performed by: PHYSICIAN ASSISTANT

## 2017-08-02 PROCEDURE — 27210794 ZZH OR GENERAL SUPPLY STERILE: Performed by: SURGERY

## 2017-08-02 PROCEDURE — 25000125 ZZHC RX 250: Performed by: SURGERY

## 2017-08-02 PROCEDURE — 37000008 ZZH ANESTHESIA TECHNICAL FEE, 1ST 30 MIN: Performed by: SURGERY

## 2017-08-02 PROCEDURE — 71000027 ZZH RECOVERY PHASE 2 EACH 15 MINS: Performed by: SURGERY

## 2017-08-02 PROCEDURE — 25000128 H RX IP 250 OP 636: Performed by: SURGERY

## 2017-08-02 PROCEDURE — 49568 ZZHC REPAIR HERNIA WITH MESH: CPT | Performed by: SURGERY

## 2017-08-02 PROCEDURE — 25000132 ZZH RX MED GY IP 250 OP 250 PS 637: Mod: GY | Performed by: SURGERY

## 2017-08-02 PROCEDURE — 37000009 ZZH ANESTHESIA TECHNICAL FEE, EACH ADDTL 15 MIN: Performed by: SURGERY

## 2017-08-02 PROCEDURE — 49568 ZZHC REPAIR HERNIA WITH MESH: CPT | Mod: AS | Performed by: PHYSICIAN ASSISTANT

## 2017-08-02 DEVICE — MESH COMPOSITE W/COLLAGEN 4CM OPEN VENTRAL PARIETEX PCO4VP: Type: IMPLANTABLE DEVICE | Site: UMBILICAL | Status: FUNCTIONAL

## 2017-08-02 RX ORDER — HYDROCODONE BITARTRATE AND ACETAMINOPHEN 5; 325 MG/1; MG/1
1-2 TABLET ORAL EVERY 4 HOURS PRN
Qty: 30 TABLET | Refills: 0 | Status: SHIPPED | OUTPATIENT
Start: 2017-08-02

## 2017-08-02 RX ORDER — FENTANYL CITRATE 50 UG/ML
25-50 INJECTION, SOLUTION INTRAMUSCULAR; INTRAVENOUS
Status: DISCONTINUED | OUTPATIENT
Start: 2017-08-02 | End: 2017-08-02 | Stop reason: HOSPADM

## 2017-08-02 RX ORDER — EPHEDRINE SULFATE 50 MG/ML
INJECTION, SOLUTION INTRAMUSCULAR; INTRAVENOUS; SUBCUTANEOUS PRN
Status: DISCONTINUED | OUTPATIENT
Start: 2017-08-02 | End: 2017-08-02

## 2017-08-02 RX ORDER — PROPOFOL 10 MG/ML
INJECTION, EMULSION INTRAVENOUS PRN
Status: DISCONTINUED | OUTPATIENT
Start: 2017-08-02 | End: 2017-08-02

## 2017-08-02 RX ORDER — ERTAPENEM 1 G/1
1 INJECTION, POWDER, LYOPHILIZED, FOR SOLUTION INTRAMUSCULAR; INTRAVENOUS EVERY 24 HOURS
Status: DISCONTINUED | OUTPATIENT
Start: 2017-08-02 | End: 2017-08-02 | Stop reason: HOSPADM

## 2017-08-02 RX ORDER — ONDANSETRON 2 MG/ML
4 INJECTION INTRAMUSCULAR; INTRAVENOUS EVERY 30 MIN PRN
Status: DISCONTINUED | OUTPATIENT
Start: 2017-08-02 | End: 2017-08-02 | Stop reason: HOSPADM

## 2017-08-02 RX ORDER — ONDANSETRON 4 MG/1
4 TABLET, ORALLY DISINTEGRATING ORAL EVERY 30 MIN PRN
Status: DISCONTINUED | OUTPATIENT
Start: 2017-08-02 | End: 2017-08-02 | Stop reason: HOSPADM

## 2017-08-02 RX ORDER — FENTANYL CITRATE 50 UG/ML
INJECTION, SOLUTION INTRAMUSCULAR; INTRAVENOUS PRN
Status: DISCONTINUED | OUTPATIENT
Start: 2017-08-02 | End: 2017-08-02

## 2017-08-02 RX ORDER — SODIUM CHLORIDE, SODIUM LACTATE, POTASSIUM CHLORIDE, CALCIUM CHLORIDE 600; 310; 30; 20 MG/100ML; MG/100ML; MG/100ML; MG/100ML
INJECTION, SOLUTION INTRAVENOUS CONTINUOUS
Status: DISCONTINUED | OUTPATIENT
Start: 2017-08-02 | End: 2017-08-02 | Stop reason: HOSPADM

## 2017-08-02 RX ORDER — HYDROCODONE BITARTRATE AND ACETAMINOPHEN 5; 325 MG/1; MG/1
1-2 TABLET ORAL
Status: COMPLETED | OUTPATIENT
Start: 2017-08-02 | End: 2017-08-02

## 2017-08-02 RX ORDER — NALOXONE HYDROCHLORIDE 0.4 MG/ML
.1-.4 INJECTION, SOLUTION INTRAMUSCULAR; INTRAVENOUS; SUBCUTANEOUS
Status: DISCONTINUED | OUTPATIENT
Start: 2017-08-02 | End: 2017-08-02 | Stop reason: HOSPADM

## 2017-08-02 RX ORDER — SODIUM CHLORIDE, SODIUM LACTATE, POTASSIUM CHLORIDE, CALCIUM CHLORIDE 600; 310; 30; 20 MG/100ML; MG/100ML; MG/100ML; MG/100ML
INJECTION, SOLUTION INTRAVENOUS CONTINUOUS PRN
Status: DISCONTINUED | OUTPATIENT
Start: 2017-08-02 | End: 2017-08-02

## 2017-08-02 RX ORDER — PROPOFOL 10 MG/ML
INJECTION, EMULSION INTRAVENOUS CONTINUOUS PRN
Status: DISCONTINUED | OUTPATIENT
Start: 2017-08-02 | End: 2017-08-02

## 2017-08-02 RX ORDER — PHYSOSTIGMINE SALICYLATE 1 MG/ML
1.2 INJECTION INTRAVENOUS
Status: DISCONTINUED | OUTPATIENT
Start: 2017-08-02 | End: 2017-08-02 | Stop reason: HOSPADM

## 2017-08-02 RX ORDER — ONDANSETRON 2 MG/ML
INJECTION INTRAMUSCULAR; INTRAVENOUS PRN
Status: DISCONTINUED | OUTPATIENT
Start: 2017-08-02 | End: 2017-08-02

## 2017-08-02 RX ORDER — FENTANYL CITRATE 50 UG/ML
25-50 INJECTION, SOLUTION INTRAMUSCULAR; INTRAVENOUS EVERY 5 MIN PRN
Status: DISCONTINUED | OUTPATIENT
Start: 2017-08-02 | End: 2017-08-02 | Stop reason: HOSPADM

## 2017-08-02 RX ORDER — MEPERIDINE HYDROCHLORIDE 25 MG/ML
12.5 INJECTION INTRAMUSCULAR; INTRAVENOUS; SUBCUTANEOUS
Status: DISCONTINUED | OUTPATIENT
Start: 2017-08-02 | End: 2017-08-02 | Stop reason: HOSPADM

## 2017-08-02 RX ORDER — LIDOCAINE HYDROCHLORIDE 20 MG/ML
INJECTION, SOLUTION INFILTRATION; PERINEURAL PRN
Status: DISCONTINUED | OUTPATIENT
Start: 2017-08-02 | End: 2017-08-02

## 2017-08-02 RX ORDER — IBUPROFEN 600 MG/1
600 TABLET, FILM COATED ORAL EVERY 8 HOURS PRN
Qty: 30 TABLET | Refills: 0 | Status: SHIPPED | OUTPATIENT
Start: 2017-08-02

## 2017-08-02 RX ORDER — BUPIVACAINE HYDROCHLORIDE AND EPINEPHRINE 2.5; 5 MG/ML; UG/ML
INJECTION, SOLUTION EPIDURAL; INFILTRATION; INTRACAUDAL; PERINEURAL
Status: DISCONTINUED
Start: 2017-08-02 | End: 2017-08-02 | Stop reason: HOSPADM

## 2017-08-02 RX ORDER — LIDOCAINE HYDROCHLORIDE 10 MG/ML
INJECTION, SOLUTION INFILTRATION; PERINEURAL
Status: DISCONTINUED
Start: 2017-08-02 | End: 2017-08-02 | Stop reason: HOSPADM

## 2017-08-02 RX ADMIN — ERTAPENEM SODIUM 1 G: 1 INJECTION, POWDER, LYOPHILIZED, FOR SOLUTION INTRAMUSCULAR; INTRAVENOUS at 09:26

## 2017-08-02 RX ADMIN — MIDAZOLAM HYDROCHLORIDE 1 MG: 1 INJECTION, SOLUTION INTRAMUSCULAR; INTRAVENOUS at 09:19

## 2017-08-02 RX ADMIN — FENTANYL CITRATE 25 MCG: 50 INJECTION, SOLUTION INTRAMUSCULAR; INTRAVENOUS at 09:35

## 2017-08-02 RX ADMIN — LIDOCAINE HYDROCHLORIDE 2 ML GIVEN: 10 INJECTION, SOLUTION EPIDURAL; INFILTRATION; INTRACAUDAL; PERINEURAL at 10:09

## 2017-08-02 RX ADMIN — SODIUM CHLORIDE, POTASSIUM CHLORIDE, SODIUM LACTATE AND CALCIUM CHLORIDE: 600; 310; 30; 20 INJECTION, SOLUTION INTRAVENOUS at 09:17

## 2017-08-02 RX ADMIN — FENTANYL CITRATE 50 MCG: 50 INJECTION, SOLUTION INTRAMUSCULAR; INTRAVENOUS at 09:19

## 2017-08-02 RX ADMIN — Medication 7.5 MG: at 09:38

## 2017-08-02 RX ADMIN — LIDOCAINE HYDROCHLORIDE 50 MG: 20 INJECTION, SOLUTION INFILTRATION; PERINEURAL at 09:19

## 2017-08-02 RX ADMIN — Medication 7.5 MG: at 09:36

## 2017-08-02 RX ADMIN — FENTANYL CITRATE 25 MCG: 50 INJECTION, SOLUTION INTRAMUSCULAR; INTRAVENOUS at 09:43

## 2017-08-02 RX ADMIN — PROPOFOL 20 MG: 10 INJECTION, EMULSION INTRAVENOUS at 09:43

## 2017-08-02 RX ADMIN — LIDOCAINE HYDROCHLORIDE 20 ML GIVEN: 10 INJECTION, SOLUTION EPIDURAL; INFILTRATION; INTRACAUDAL; PERINEURAL at 10:07

## 2017-08-02 RX ADMIN — PROPOFOL 75 MCG/KG/MIN: 10 INJECTION, EMULSION INTRAVENOUS at 09:19

## 2017-08-02 RX ADMIN — ONDANSETRON 4 MG: 2 INJECTION INTRAMUSCULAR; INTRAVENOUS at 10:09

## 2017-08-02 RX ADMIN — HYDROCODONE BITARTRATE AND ACETAMINOPHEN 1 TABLET: 5; 325 TABLET ORAL at 10:48

## 2017-08-02 RX ADMIN — PROPOFOL 75 MCG/KG/MIN: 10 INJECTION, EMULSION INTRAVENOUS at 09:24

## 2017-08-02 NOTE — IP AVS SNAPSHOT
[facility]    [address]    [place] [address]    Phone:  [phone]    Fax:  [phone]                                       After Visit Summary   8/2/2017    Butch Carter    MRN: [id number]           After Visit Summary Signature Page     I have received my discharge instructions, and my questions have been answered. I have discussed any challenges I see with this plan with the nurse or doctor.    ..........................................................................................................................................  Patient/Patient Representative Signature      ..........................................................................................................................................  Patient Representative Print Name and Relationship to Patient    ..................................................               ................................................  Date                                            Time    ..........................................................................................................................................  Reviewed by Signature/Title    ...................................................              ..............................................  Date                                                            Time

## 2017-08-02 NOTE — OP NOTE
Surgeon: Hari Durham MD  1st Assistant: Annamarie Choudhury PA-C, The physicians assistant was medically necessary for their expertise in hemostasis, suturing, and retraction.    PREOPERATIVE DIAGNOSIS: Ventral umbilical hernia.   POSTOPERATIVE DIAGNOSIS: Ventral umbilical hernia.   PROCEDURES:   1. Ventral umbilical hernia repair with mesh.   2. Placement of 4.6 cm mesh patch.   ANESTHESIA: MAC plus local.   OPERATIVE PROCEDURE: After intravenous sedation was provided, Butch Carter abdomen was prepped and draped in the usual sterile fashion. Sharp supraumbilical incision was made, electrocautery was utilized to dissect the subcutaneous tissue down to the abdominal wall fascia. At this point, we noted a 1.5 cm defect at the umbilicus.  Preperitoneal dissection carried under the hernia defect bluntly and with electrocautery, we then placed 4.6 cm pariatex patch was placed in the preperitoneal space and deployed flat. We then closed the hernia on top of the mesh while holding the mesh with sutures at the same time. This was done with multiple interrupted 0 Nurolon sutures. The tissue came together nicely. Hemostasis was secured. The wound was then closed in layers with 3-0 Vicryl and 4-0 Monocryl and Steri-Strips. Sterile dressing applied. Sponge and needle count reported correct. No immediate complications.

## 2017-08-02 NOTE — DISCHARGE INSTRUCTIONS
Same Day Surgery Discharge Instructions for  Sedation and General Anesthesia       It's not unusual to feel dizzy, light-headed or faint for up to 24 hours after surgery or while taking pain medication.  If you have these symptoms: sit for a few minutes before standing and have someone assist you when you get up to walk or use the bathroom.      You should rest and relax for the next 24 hours. We recommend you make arrangements to have an adult stay with you for at least 24 hours after your discharge.  Avoid hazardous and strenuous activity.      DO NOT DRIVE any vehicle or operate mechanical equipment for 24 hours following the end of your surgery.  Even though you may feel normal, your reactions may be affected by the medication you have received.      Do not drink alcoholic beverages for 24 hours following surgery.       Slowly progress to your regular diet as you feel able. It's not unusual to feel nauseated and/or vomit after receiving anesthesia.  If you develop these symptoms, drink clear liquids (apple juice, ginger ale, broth, 7-up, etc. ) until you feel better.  If your nausea and vomiting persists for 24 hours, please notify your surgeon.        All narcotic pain medications, along with inactivity and anesthesia, can cause constipation. Drinking plenty of liquids and increasing fiber intake will help.      For any questions of a medical nature, call your surgeon.      Do not make important decisions for 24 hours.      If you had general anesthesia, you may have a sore throat for a couple of days related to the breathing tube used during surgery.  You may use Cepacol lozenges to help with this discomfort.  If it worsens or if you develop a fever, contact your surgeon.     If you feel your pain is not well managed with the pain medications prescribed by your surgeon, please contact your surgeon's office to let them know so they can address your concerns.       [facility]  Discharge Instructions: Post-Operative Umbilical or Ventral Hernia    ACTIVITY    Increase your activity gradually.  Avoid strenuous physical activity or heavy lifting greater than 15lbs. for 4 weeks.  You may climb stairs.    You may drive without restrictions when you are not using any prescription pain medication and comfortable in a car.    You may return to work/school when you are comfortable without any prescription pain medication.    WOUND CARE    You may remove your bandage and shower 48 hours after the surgery.  Pat your incisions dry and leave open to air.  Re-apply dressing (Band-aid or gauze/tape) as needed for drainage.    You may have steri-strips (looks like tape) or Dermabond (looks like glue) on your incision.  Leave it alone, it will peel up and fall off on its own.     Do not soak your incisions in a tub or pool for 2 weeks.     A lump or ridge under the incision is normal and will gradually resolve.    DIET    Return to the diet you were on before surgery.    Pain medications can cause constipation.  Limit use when possible.  Take over the counter stool softener/stimulant, such as Colace or Senna, with plenty of water.      PAIN    Expect some tenderness and discomfort at the incision site(s).  Use the prescribed pain medication at your discretion.  Expect gradual resolution of your pain over several days.    You may take ibuprofen with food (unless you have been told not to) instead of or in addition to your prescribed pain medication.  If you are taking Norco or Percocet, do not take any additional acetaminophen/APAP/Tylenol.      Do not drink alcohol or drive while you are taking pain medications.    You may apply ice to your incisions in 20 minute intervals as needed for the next 48 hours.  After that time, consider switching to heat if you prefer.    RETURN APPOINTMENT    Follow up with your surgeon or a PA in 2 weeks.  Please call the office at [phone] to schedule your  appointment.    CALL OUR OFFICE IF YOU HAVE:     Chills or fever above 101.5 F.    Increased redness or drainage at your incisions.    Significant bleeding.    Pain not relieved by your pain medication or rest.    Increasing pain after the first 48 hours.    Any other concerns or questions.    Revised August 2017

## 2017-08-02 NOTE — ANESTHESIA PREPROCEDURE EVALUATION
Anesthesia Evaluation     . Pt has had prior anesthetic. Type: General           ROS/MED HX    ENT/Pulmonary:       Neurologic:       Cardiovascular:     (+) Dyslipidemia, hypertension----. : . . . :. .       METS/Exercise Tolerance:     Hematologic:     (+) Anemia, -      Musculoskeletal:         GI/Hepatic:         Renal/Genitourinary:         Endo:         Psychiatric:         Infectious Disease:         Malignancy:         Other:                                    Anesthesia Plan      History & Physical Review  History and physical reviewed and following examination; no interval change.    ASA Status:  2 .        Plan for MAC with Intravenous induction.   PONV prophylaxis:  Ondansetron (or other 5HT-3) and Dexamethasone or Solumedrol       Postoperative Care  Postoperative pain management:  IV analgesics and Oral pain medications.      Consents  Anesthetic plan, risks, benefits and alternatives discussed with:  Patient..                          .

## 2017-08-02 NOTE — ANESTHESIA CARE TRANSFER NOTE
Patient: Butch Carter    Procedure(s):  VENTRAL HERNIA REPAIR WITH MESH - Wound Class: I-Clean    Diagnosis: VENTRAL HERNIA  Diagnosis Additional Information: No value filed.    Anesthesia Type:   MAC     Note:  Airway :Nasal Cannula  Patient transferred to:PACU  Comments: At end of procedure, spontaneous respirations, patient alert to voice, able to follow commands. Oxygen via nasal cannula at 2 liters per minute to PACU. Oxygen tubing connected to wall O2 in PACU, SpO2, NiBP, and EKG monitors and alarms on and functioning, Barbara Hugger warmer connected to patient gown, report on patient's clinical status given to PACU RN, RN questions answered.      Vitals: (Last set prior to Anesthesia Care Transfer)    CRNA VITALS  8/2/2017 0945 - 8/2/2017 1020      8/2/2017             Resp Rate (set): 10                Electronically Signed By: ÁNGEL Akins CRNA  August 2, 2017  10:20 AM

## 2017-08-02 NOTE — ANESTHESIA POSTPROCEDURE EVALUATION
Patient: Butch Carter    Procedure(s):  VENTRAL HERNIA REPAIR WITH MESH - Wound Class: I-Clean    Diagnosis:VENTRAL HERNIA  Diagnosis Additional Information: No value filed.    Anesthesia Type:  MAC    Note:  Anesthesia Post Evaluation    Patient location during evaluation: PACU  Patient participation: Able to fully participate in evaluation  Level of consciousness: awake  Pain management: adequate  Airway patency: patent  Cardiovascular status: acceptable  Respiratory status: acceptable  Hydration status: acceptable  PONV: controlled     Anesthetic complications: None          Last vitals:  Vitals:    08/02/17 0703 08/02/17 1017 08/02/17 1030   BP: 143/72 117/64 109/60   Resp: 16 16 12   Temp: 36.1  C (97  F) 36.3  C (97.3  F)    SpO2: 97% 95% 96%         Electronically Signed By: Vinay Galvan MD  August 2, 2017  10:35 AM

## 2017-08-02 NOTE — IP AVS SNAPSHOT
MRN:[id number]                      After Visit Summary   8/2/2017    Butch Carter    MRN: [id number]           Thank you!     Thank you for choosing [facility] for your care. Our goal is always to provide you with excellent care. Hearing back from our patients is one way we can continue to improve our services. Please take a few minutes to complete the written survey that you may receive in the mail after you visit with us. Thank you!        Patient Information     Date Of Birth          10/9/1933        About your hospital stay     You were admitted on:  August 2, 2017 [facility]    You were discharged on:  August 2, 2017       Who to Call     For medical emergencies, please call 911.  For non-urgent questions about your medical care, please call your primary care provider or clinic, [phone]  For questions related to your surgery, please call your surgery clinic        Attending Provider     Provider Specialty    Hari Durham MD Surgery       Primary Care Provider Office Phone # Fax #    Tate Hsu [phone]      After Care Instructions     Diet Instructions       Resume pre-procedure diet            Discharge Instructions       Patient to follow up with appointment in 2 weeks            Ice to affected area       Ice to operative site PRN            No lifting        No lifting over 20 lbs and no strenuous physical activity for 4 weeks            Shower       No shower for 24 hours post procedure. May shower Postoperative Day (POD)  1                  Your next 10 appointments already scheduled     Aug 16, 2017 12:00 PM CDT   Post Op with Hari Durham MD   [facility]    [address], [address]  [facility] [id number] [phone]              Further instructions from your care team       Same Day Surgery Discharge Instructions for  Sedation and General  Anesthesia       It's not unusual to feel dizzy, light-headed or faint for up to 24 hours after surgery or while taking pain medication.  If you have these symptoms: sit for a few minutes before standing and have someone assist you when you get up to walk or use the bathroom.      You should rest and relax for the next 24 hours. We recommend you make arrangements to have an adult stay with you for at least 24 hours after your discharge.  Avoid hazardous and strenuous activity.      DO NOT DRIVE any vehicle or operate mechanical equipment for 24 hours following the end of your surgery.  Even though you may feel normal, your reactions may be affected by the medication you have received.      Do not drink alcoholic beverages for 24 hours following surgery.       Slowly progress to your regular diet as you feel able. It's not unusual to feel nauseated and/or vomit after receiving anesthesia.  If you develop these symptoms, drink clear liquids (apple juice, ginger ale, broth, 7-up, etc. ) until you feel better.  If your nausea and vomiting persists for 24 hours, please notify your surgeon.        All narcotic pain medications, along with inactivity and anesthesia, can cause constipation. Drinking plenty of liquids and increasing fiber intake will help.      For any questions of a medical nature, call your surgeon.      Do not make important decisions for 24 hours.      If you had general anesthesia, you may have a sore throat for a couple of days related to the breathing tube used during surgery.  You may use Cepacol lozenges to help with this discomfort.  If it worsens or if you develop a fever, contact your surgeon.     If you feel your pain is not well managed with the pain medications prescribed by your surgeon, please contact your surgeon's office to let them know so they can address your concerns.       [facility]  Discharge Instructions: Post-Operative Umbilical or Ventral  Hernia    ACTIVITY    Increase your activity gradually.  Avoid strenuous physical activity or heavy lifting greater than 15lbs. for 4 weeks.  You may climb stairs.    You may drive without restrictions when you are not using any prescription pain medication and comfortable in a car.    You may return to work/school when you are comfortable without any prescription pain medication.    WOUND CARE    You may remove your bandage and shower 48 hours after the surgery.  Pat your incisions dry and leave open to air.  Re-apply dressing (Band-aid or gauze/tape) as needed for drainage.    You may have steri-strips (looks like tape) or Dermabond (looks like glue) on your incision.  Leave it alone, it will peel up and fall off on its own.     Do not soak your incisions in a tub or pool for 2 weeks.     A lump or ridge under the incision is normal and will gradually resolve.    DIET    Return to the diet you were on before surgery.    Pain medications can cause constipation.  Limit use when possible.  Take over the counter stool softener/stimulant, such as Colace or Senna, with plenty of water.      PAIN    Expect some tenderness and discomfort at the incision site(s).  Use the prescribed pain medication at your discretion.  Expect gradual resolution of your pain over several days.    You may take ibuprofen with food (unless you have been told not to) instead of or in addition to your prescribed pain medication.  If you are taking Norco or Percocet, do not take any additional acetaminophen/APAP/Tylenol.      Do not drink alcohol or drive while you are taking pain medications.    You may apply ice to your incisions in 20 minute intervals as needed for the next 48 hours.  After that time, consider switching to heat if you prefer.    RETURN APPOINTMENT    Follow up with your surgeon or a PA in 2 weeks.  Please call the office at [phone] to schedule your appointment.    CALL OUR OFFICE IF YOU HAVE:     Chills or fever above  "101.5 F.    Increased redness or drainage at your incisions.    Significant bleeding.    Pain not relieved by your pain medication or rest.    Increasing pain after the first 48 hours.    Any other concerns or questions.    Revised 2017      Pending Results     No orders found from 2017 to 8/3/2017.            Admission Information     Date & Time Provider Department Dept. Phone    2017 Hari Durham MD [facility] Same Day Surgery [phone]      Your Vitals Were     Blood Pressure Temperature Respirations Weight Pulse Oximetry BMI (Body Mass Index)    94/68 95.4  F (35.2  C) 12 73.5 kg (162 lb) 92% 23.92 kg/m2      MyChart Information     [employer] lets you send messages to your doctor, view your test results, renew your prescriptions, schedule appointments and more. To sign up, go to [email or web address] . Click on \"Log in\" on the left side of the screen, which will take you to the Welcome page. Then click on \"Sign up Now\" on the right side of the page.     You will be asked to enter the access code listed below, as well as some personal information. Please follow the directions to create your username and password.     Your access code is: [email or web address]  Expires: 2017  3:26 PM     Your access code will  in 90 days. If you need help or a new code, please call your [place] clinic or [phone].        Care EveryWhere ID     This is your Care EveryWhere ID. This could be used by other organizations to access your [facility] medical records  [id number]        Equal Access to Services     [facility]: Hadii lashaun barba hadmichelleo Sojanice, waaxda luqadaha, qaybta kaalmada amanda crouch . So [facility] [phone].    ATENCIÓN: Si habla español, tiene a bhakta disposición servicios gratuitos de asistencia lingüística. Llame al [phone].    We comply with applicable federal civil rights laws and [place] laws. We do not discriminate on the basis of " race, color, national origin, age, disability sex, sexual orientation or gender identity.               Review of your medicines      START taking        Dose / Directions    HYDROcodone-acetaminophen 5-325 MG per tablet   Commonly known as:  NORCO   Used for:  Acute post-operative pain   Notes to Patient:  One tablet given at 10:48 A.M.        Dose:  1-2 tablet   Take 1-2 tablets by mouth every 4 hours as needed for other (Moderate to Severe Pain)   Quantity:  30 tablet   Refills:  0       ibuprofen 600 MG tablet   Commonly known as:  ADVIL/MOTRIN   Used for:  Acute post-operative pain        Dose:  600 mg   Take 1 tablet (600 mg) by mouth every 8 hours as needed for pain (mild)   Quantity:  30 tablet   Refills:  0         CONTINUE these medicines which have NOT CHANGED        Dose / Directions    alendronate 70 MG tablet   Commonly known as:  FOSAMAX   Used for:  Osteoporosis        TAKE 1 TABLET (70MG) BY MOUTH EVERY 7 DAYS   Quantity:  12 tablet   Refills:  3       aspirin 81 MG tablet   Used for:  Hypertension goal BP (blood pressure) < 140/90        Dose:  81 mg   Take 1 tablet (81 mg) by mouth daily *   Quantity:  100 tablet   Refills:  1       B-12 PO        Refills:  0       calcium carbonate 1250 MG tablet   Commonly known as:  OS-SUSHIL 500 mg [place]. Ca        Dose:  1 tablet   Take 1 tablet by mouth 2 times daily   Refills:  0       CENTRUM SILVER ADULT 50+ PO        Refills:  0       ferrous sulfate 325 (65 FE) MG tablet   Commonly known as:  IRON   Used for:  Iron deficiency anemia, unspecified iron deficiency anemia type        Dose:  325 mg   Take 1 tablet (325 mg) by mouth 2 times daily   Quantity:  180 tablet   Refills:  3       Fish Oil 1000 MG Cpdr        Take by mouth 2 times daily   Refills:  0       GLUCOSAMINE CHONDR 1500 COMPLX PO        Refills:  0       lisinopril 5 MG tablet   Commonly known as:  PRINIVIL/ZESTRIL   Used for:  Essential hypertension with goal blood pressure less than 140/90         Dose:  5 mg   Take 1 tablet (5 mg) by mouth daily   Quantity:  90 tablet   Refills:  0       LUTEIN        daily   Refills:  0       sildenafil 100 MG cap/tab   Commonly known as:  REVATIO/VIAGRA   Used for:  Erectile dysfunction, unspecified erectile dysfunction type        Dose:   mg   Take 0.5-1 tablets ( mg) by mouth daily as needed for erectile dysfunction Take 30 min to 4 hours before intercourse.  Never use with nitroglycerin, terazosin or doxazosin.   Quantity:  20 tablet   Refills:  5       simvastatin 80 MG tablet   Commonly known as:  ZOCOR   Used for:  Hyperlipidemia LDL goal <130        Dose:  80 mg   Take 1 tablet (80 mg) by mouth At Bedtime   Quantity:  90 tablet   Refills:  1       terazosin 5 MG capsule   Commonly known as:  HYTRIN   Used for:  Benign non-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        Dose:  5 mg   Take 1 capsule (5 mg) by mouth At Bedtime   Quantity:  90 capsule   Refills:  3       VITAMIN D (CHOLECALCIFEROL) PO        Take by mouth daily   Refills:  0       zolpidem 10 MG tablet   Commonly known as:  AMBIEN   Used for:  Insomnia, unspecified type        Dose:  10 mg   Take 1 tablet (10 mg) by mouth nightly as needed for sleep   Quantity:  90 tablet   Refills:  1            Where to get your medicines      These medications were sent to [facility] - [address]  [address] [facility] [address], [place] [address]     Phone:  [phone]     ibuprofen 600 MG tablet         Some of these will need a paper prescription and others can be bought over the counter. Ask your nurse if you have questions.     Bring a paper prescription for each of these medications     HYDROcodone-acetaminophen 5-325 MG per tablet                Protect others around you: Learn how to safely use, store and throw away your medicines at www.disposemymeds.org.             Medication List: This is a list of all your medications and when to take  them. Check marks below indicate your daily home schedule. Keep this list as a reference.      Medications           Morning Afternoon Evening Bedtime As Needed    alendronate 70 MG tablet   Commonly known as:  FOSAMAX   TAKE 1 TABLET (70MG) BY MOUTH EVERY 7 DAYS                                aspirin 81 MG tablet   Take 1 tablet (81 mg) by mouth daily *                                B-12 PO                                calcium carbonate 1250 MG tablet   Commonly known as:  OS-SUSHIL 500 mg [place]. Ca   Take 1 tablet by mouth 2 times daily                                CENTRUM SILVER ADULT 50+ PO                                ferrous sulfate 325 (65 FE) MG tablet   Commonly known as:  IRON   Take 1 tablet (325 mg) by mouth 2 times daily                                Fish Oil 1000 MG Cpdr   Take by mouth 2 times daily                                GLUCOSAMINE CHONDR 1500 COMPLX PO                                HYDROcodone-acetaminophen 5-325 MG per tablet   Commonly known as:  NORCO   Take 1-2 tablets by mouth every 4 hours as needed for other (Moderate to Severe Pain)   Last time this was given:  1 tablet on 8/2/2017 10:48 AM   Notes to Patient:  One tablet given at 10:48 A.M.                                ibuprofen 600 MG tablet   Commonly known as:  ADVIL/MOTRIN   Take 1 tablet (600 mg) by mouth every 8 hours as needed for pain (mild)                                lisinopril 5 MG tablet   Commonly known as:  PRINIVIL/ZESTRIL   Take 1 tablet (5 mg) by mouth daily                                LUTEIN   daily                                sildenafil 100 MG cap/tab   Commonly known as:  REVATIO/VIAGRA   Take 0.5-1 tablets ( mg) by mouth daily as needed for erectile dysfunction Take 30 min to 4 hours before intercourse.  Never use with nitroglycerin, terazosin or doxazosin.                                simvastatin 80 MG tablet   Commonly known as:  ZOCOR   Take 1 tablet (80 mg) by mouth At Bedtime                                 terazosin 5 MG capsule   Commonly known as:  HYTRIN   Take 1 capsule (5 mg) by mouth At Bedtime                                VITAMIN D (CHOLECALCIFEROL) PO   Take by mouth daily                                zolpidem 10 MG tablet   Commonly known as:  AMBIEN   Take 1 tablet (10 mg) by mouth nightly as needed for sleep

## 2017-08-04 ENCOUNTER — TELEPHONE (OUTPATIENT)
Dept: SURGERY | Facility: CLINIC | Age: 82
End: 2017-08-04

## 2017-08-04 NOTE — TELEPHONE ENCOUNTER
Attempted to call patient and do post op check with them , left VM with call back ph.# if they wished to call clinic back.    Ida Griffin RN BSN

## 2017-08-16 ENCOUNTER — OFFICE VISIT (OUTPATIENT)
Dept: SURGERY | Facility: CLINIC | Age: 82
End: 2017-08-16
Payer: MEDICARE

## 2017-08-16 DIAGNOSIS — Z09 SURGERY FOLLOW-UP EXAMINATION: Primary | ICD-10-CM

## 2017-08-16 PROCEDURE — 99024 POSTOP FOLLOW-UP VISIT: CPT | Performed by: SURGERY

## 2017-08-16 NOTE — MR AVS SNAPSHOT
"              After Visit Summary   2017    Butch Carter    MRN: [id number]           Patient Information     Date Of Birth          10/9/1933        Visit Information        Provider Department      2017 12:00 PM Hari Durham MD [facility] Hernia      Today's Diagnoses     Surgery follow-up examination    -  1       Follow-ups after your visit        Who to contact     If you have questions or need follow up information about today's clinic visit or your schedule please contact [facility] directly at [phone].  Normal or non-critical lab and imaging results will be communicated to you by [employer], letter or phone within 4 business days after the clinic has received the results. If you do not hear from us within 7 days, please contact the clinic through [employer] or phone. If you have a critical or abnormal lab result, we will notify you by phone as soon as possible.  Submit refill requests through [employer] or call your pharmacy and they will forward the refill request to us. Please allow 3 business days for your refill to be completed.          Additional Information About Your Visit        MyChart Information     [employer] lets you send messages to your doctor, view your test results, renew your prescriptions, schedule appointments and more. To sign up, go to [email or web address] . Click on \"Log in\" on the left side of the screen, which will take you to the Welcome page. Then click on \"Sign up Now\" on the right side of the page.     You will be asked to enter the access code listed below, as well as some personal information. Please follow the directions to create your username and password.     Your access code is: [email or web address]  Expires: 2017  3:26 PM     Your access code will  in 90 days. If you need help or a new code, please call your [place] clinic or [phone].        Care EveryWhere ID     This is your Care " EveryWhere ID. This could be used by other organizations to access your [place] medical records  [id number]         Blood Pressure from Last 3 Encounters:   08/02/17 128/72   07/18/17 108/63   06/28/17 124/60    Weight from Last 3 Encounters:   08/02/17 162 lb (73.5 kg)   07/18/17 162 lb (73.5 kg)   06/28/17 160 lb (72.6 kg)              Today, you had the following     No orders found for display       Primary Care Provider Office Phone # Fax #    Tate Hsu [phone]       [address]  [facility] [address]        Equal Access to Services     [facility] : Hadii lashaun ureña Sojanice, waaxda luqshayna, qaybta kaalmada adebrentyada, amanda herrera . So [facility] [phone].    ATENCIÓN: Si habla español, tiene a bhakta disposición servicios gratuitos de asistencia lingüística. Llame al [phone].    We comply with applicable federal civil rights laws and [place] laws. We do not discriminate on the basis of race, color, national origin, age, disability sex, sexual orientation or gender identity.            Thank you!     Thank you for choosing [facility]  for your care. Our goal is always to provide you with excellent care. Hearing back from our patients is one way we can continue to improve our services. Please take a few minutes to complete the written survey that you may receive in the mail after your visit with us. Thank you!             Your Updated Medication List - Protect others around you: Learn how to safely use, store and throw away your medicines at www.disposemymeds.org.          This list is accurate as of: 8/16/17  1:07 PM.  Always use your most recent med list.                   Brand Name Dispense Instructions for use Diagnosis    alendronate 70 MG tablet    FOSAMAX    12 tablet    TAKE 1 TABLET (70MG) BY MOUTH EVERY 7 DAYS    Osteoporosis       aspirin 81 MG tablet     100 tablet    Take 1 tablet (81 mg) by mouth daily *     Hypertension goal BP (blood pressure) < 140/90       B-12 PO           calcium carbonate 1250 MG tablet    OS-SUSHIL 500 mg [place]. Ca     Take 1 tablet by mouth 2 times daily        CENTRUM SILVER ADULT 50+ PO           ferrous sulfate 325 (65 FE) MG tablet    IRON    180 tablet    Take 1 tablet (325 mg) by mouth 2 times daily    Iron deficiency anemia, unspecified iron deficiency anemia type       Fish Oil 1000 MG Cpdr      Take by mouth 2 times daily        GLUCOSAMINE CHONDR 1500 COMPLX PO           HYDROcodone-acetaminophen 5-325 MG per tablet    NORCO    30 tablet    Take 1-2 tablets by mouth every 4 hours as needed for other (Moderate to Severe Pain)    Acute post-operative pain       ibuprofen 600 MG tablet    ADVIL/MOTRIN    30 tablet    Take 1 tablet (600 mg) by mouth every 8 hours as needed for pain (mild)    Acute post-operative pain       lisinopril 5 MG tablet    PRINIVIL/ZESTRIL    90 tablet    Take 1 tablet (5 mg) by mouth daily    Essential hypertension with goal blood pressure less than 140/90       LUTEIN      daily        sildenafil 100 MG cap/tab    REVATIO/VIAGRA    20 tablet    Take 0.5-1 tablets ( mg) by mouth daily as needed for erectile dysfunction Take 30 min to 4 hours before intercourse.  Never use with nitroglycerin, terazosin or doxazosin.    Erectile dysfunction, unspecified erectile dysfunction type       simvastatin 80 MG tablet    ZOCOR    90 tablet    Take 1 tablet (80 mg) by mouth At Bedtime    Hyperlipidemia LDL goal <130       terazosin 5 MG capsule    HYTRIN    90 capsule    Take 1 capsule (5 mg) by mouth At Bedtime    Benign non-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       VITAMIN D (CHOLECALCIFEROL) PO      Take by mouth daily        zolpidem 10 MG tablet    AMBIEN    90 tablet    Take 1 tablet (10 mg) by mouth nightly as needed for sleep    Insomnia, unspecified type

## 2017-08-16 NOTE — PROGRESS NOTES
First postoperative visit for Mr. Carter after abdominal hernia repair.  He has been healing quite well, pain was minimal.  His gastrointestinal function is back to normal.    On exam his incision has healed nicely without evidence of infection or hernia.    Good recovery after abdominal wall hernia repair.  Mr. Carter is aware of the physical limitations he should follow for the next two weeks.  He is looking forward to increasing his physical activity and return to the exercises he had been doing prior.    He will return to see us as needed, if questions please give me a call.    Best regards.    Please route or send letter to:  Primary Care Provider (PCP), Referring Provider, Include Op Note and Include Progress Note

## 2017-08-16 NOTE — LETTER
2017      RE:  Butch Carter-:  [birth year]    First postoperative visit for Mr. Carter after abdominal hernia repair.  He has been healing quite well, pain was minimal.  His gastrointestinal function is back to normal.     On exam his incision has healed nicely without evidence of infection or hernia.     Good recovery after abdominal wall hernia repair.  Mr. Carter is aware of the physical limitations he should follow for the next two weeks.  He is looking forward to increasing his physical activity and return to the exercises he had been doing prior.     He will return to see us as needed, if questions please give me a call.     Best regards,      Hari Durham MD

## 2017-09-20 DIAGNOSIS — G47.00 INSOMNIA, UNSPECIFIED TYPE: ICD-10-CM

## 2017-09-20 RX ORDER — ZOLPIDEM TARTRATE 10 MG/1
10 TABLET ORAL
Qty: 90 TABLET | Refills: 1 | Status: SHIPPED | OUTPATIENT
Start: 2017-09-20 | End: 2017-10-05

## 2017-09-20 NOTE — TELEPHONE ENCOUNTER
zolpidem (AMBIEN) 10 MG tablet      Last Written Prescription Date:  01/18/17  Last Fill Quantity: 90,   # refills: 1  Last Office Visit with [facility], P or [facility] prescribing provider: 07/18/17  Future Office visit:       Routing refill request to provider for review/approval because:  Drug not on the [facility], [facility] or [facility] refill protocol or controlled substance      [facility]

## 2017-09-20 NOTE — TELEPHONE ENCOUNTER
Written rx faxed to the pharmacy, Pharmacy will contact pt when medication is ready for pickup.  Dagoberto Son,  For [facility] [employer]

## 2017-10-05 ENCOUNTER — OFFICE VISIT (OUTPATIENT)
Dept: FAMILY MEDICINE | Facility: CLINIC | Age: 82
End: 2017-10-05
Payer: MEDICARE

## 2017-10-05 VITALS
OXYGEN SATURATION: 98 % | HEART RATE: 62 BPM | TEMPERATURE: 96.6 F | SYSTOLIC BLOOD PRESSURE: 128 MMHG | DIASTOLIC BLOOD PRESSURE: 71 MMHG | BODY MASS INDEX: 24.29 KG/M2 | WEIGHT: 164 LBS | HEIGHT: 69 IN

## 2017-10-05 DIAGNOSIS — D50.9 IRON DEFICIENCY ANEMIA, UNSPECIFIED IRON DEFICIENCY ANEMIA TYPE: ICD-10-CM

## 2017-10-05 DIAGNOSIS — Z00.00 ROUTINE HISTORY AND PHYSICAL EXAMINATION OF ADULT: Primary | ICD-10-CM

## 2017-10-05 DIAGNOSIS — I10 ESSENTIAL HYPERTENSION WITH GOAL BLOOD PRESSURE LESS THAN 140/90: ICD-10-CM

## 2017-10-05 DIAGNOSIS — G47.00 INSOMNIA, UNSPECIFIED TYPE: ICD-10-CM

## 2017-10-05 DIAGNOSIS — N40.1 BENIGN NON-NODULAR PROSTATIC HYPERPLASIA WITH LOWER URINARY TRACT SYMPTOMS: ICD-10-CM

## 2017-10-05 DIAGNOSIS — E78.5 HYPERLIPIDEMIA LDL GOAL <130: ICD-10-CM

## 2017-10-05 DIAGNOSIS — M81.0 OSTEOPOROSIS, UNSPECIFIED OSTEOPOROSIS TYPE, UNSPECIFIED PATHOLOGICAL FRACTURE PRESENCE: ICD-10-CM

## 2017-10-05 LAB
CHOLEST SERPL-MCNC: 83 MG/DL
CREAT UR-MCNC: 78 MG/DL
HDLC SERPL-MCNC: 43 MG/DL
LDLC SERPL CALC-MCNC: 29 MG/DL
MICROALBUMIN UR-MCNC: <5 MG/L
MICROALBUMIN/CREAT UR: NORMAL MG/G CR (ref 0–17)
NONHDLC SERPL-MCNC: 40 MG/DL
TRIGL SERPL-MCNC: 55 MG/DL

## 2017-10-05 PROCEDURE — 36415 COLL VENOUS BLD VENIPUNCTURE: CPT | Performed by: FAMILY MEDICINE

## 2017-10-05 PROCEDURE — G0438 PPPS, INITIAL VISIT: HCPCS | Performed by: FAMILY MEDICINE

## 2017-10-05 PROCEDURE — 82043 UR ALBUMIN QUANTITATIVE: CPT | Performed by: FAMILY MEDICINE

## 2017-10-05 PROCEDURE — 80061 LIPID PANEL: CPT | Performed by: FAMILY MEDICINE

## 2017-10-05 RX ORDER — TERAZOSIN 5 MG/1
5 CAPSULE ORAL AT BEDTIME
Qty: 90 CAPSULE | Refills: 3 | Status: SHIPPED | OUTPATIENT
Start: 2017-10-05 | End: 2018-09-19

## 2017-10-05 RX ORDER — ALENDRONATE SODIUM 70 MG/1
TABLET ORAL
Qty: 12 TABLET | Refills: 3 | Status: SHIPPED | OUTPATIENT
Start: 2017-10-05 | End: 2018-09-19

## 2017-10-05 RX ORDER — ZOLPIDEM TARTRATE 10 MG/1
10 TABLET ORAL
Qty: 90 TABLET | Refills: 3 | Status: SHIPPED | OUTPATIENT
Start: 2017-10-05 | End: 2018-09-19

## 2017-10-05 RX ORDER — SIMVASTATIN 80 MG
80 TABLET ORAL AT BEDTIME
Qty: 90 TABLET | Refills: 3 | Status: SHIPPED | OUTPATIENT
Start: 2017-10-05 | End: 2018-09-19

## 2017-10-05 RX ORDER — LISINOPRIL 5 MG/1
5 TABLET ORAL DAILY
Qty: 90 TABLET | Refills: 3 | Status: SHIPPED | OUTPATIENT
Start: 2017-10-05 | End: 2018-09-19

## 2017-10-05 RX ORDER — FERROUS SULFATE 325(65) MG
325 TABLET ORAL 2 TIMES DAILY
Qty: 180 TABLET | Refills: 3 | Status: SHIPPED | OUTPATIENT
Start: 2017-10-05 | End: 2018-09-19

## 2017-10-05 NOTE — PROGRESS NOTES
SUBJECTIVE:   Butch Carter is a 83 year old male who presents for Preventive Visit.    Are you in the first 12 months of your Medicare Part B coverage?  No    Healthy Habits:    Do you get at least three servings of calcium containing foods daily (dairy, green leafy vegetables, etc.)? yes    Amount of exercise or daily activities, outside of work: 3 day(s) per week    Problems taking medications regularly No    Medication side effects: No    Have you had an eye exam in the past two years? yes    Do you see a dentist twice per year? yes    Do you have sleep apnea, excessive snoring or daytime drowsiness?no    COGNITIVE SCREEN  1) Repeat 3 items (Banana, Sunrise, Chair)    2) Clock draw: NORMAL  3) 3 item recall: Recalls NO objects   Results: 0 items recalled: PROBABLE COGNITIVE IMPAIRMENT, **INFORM PROVIDER**    Mini-CogTM Copyright S Eliezer. Licensed by the author for use in [facility]; reprinted with permission (mary ellen@[facility]). All rights reserved.          Reviewed and updated as needed this visit by clinical staffTobacco  Allergies  Meds         Reviewed and updated as needed this visit by Provider        Social History   Substance Use Topics     Smoking status: Never Smoker     Smokeless tobacco: Never Used     Alcohol use No       The patient does not drink >3 drinks per day nor >7 drinks per week.    Today's PHQ-2 Score:   PHQ-2 ( 1999 Pf[employer]) 11/1/2016 7/13/2016   Q1: Little interest or pleasure in doing things 0 0   Q2: Feeling down, depressed or hopeless 0 0   PHQ-2 Score 0 0         Do you feel safe in your environment - Yes    Do you have a Health Care Directive?: Yes: Advance Directive has been received and scanned.    Current providers sharing in care for this patient include: Patient Care Team:  Tate Hsu MD as PCP - General (Family Practice)      Hearing impairment: No    Ability to successfully perform activities of daily living: Yes, no assistance needed     Fall risk:    "      Home safety:  none identified      The following health maintenance items are reviewed in Epic and correct as of today:Health Maintenance   Topic Date Due     MEDICARE ANNUAL WELLNESS VISIT  10/09/1951     FALL RISK ASSESSMENT  07/13/2017     ADVANCE DIRECTIVE PLANNING Q5 YRS  09/25/2017     MICROALBUMIN Q1 YEAR  11/01/2017     BMP Q1 YR  07/18/2018     TETANUS IMMUNIZATION (SYSTEM ASSIGNED)  09/25/2022     INFLUENZA VACCINE (SYSTEM ASSIGNED)  Completed     PNEUMOCOCCAL  Completed     Labs reviewed in EPIC    ROS:  Constitutional, HEENT, cardiovascular, pulmonary, GI, , musculoskeletal, neuro, skin, endocrine and psych systems are negative, except as otherwise noted.      OBJECTIVE:   /71 (BP Location: Left arm, Patient Position: Chair, Cuff Size: Adult Regular)  Pulse 62  Temp 96.6  F (35.9  C) (Oral)  Ht 5' 9\" (1.753 m)  Wt 164 lb (74.4 kg)  SpO2 98%  BMI 24.22 kg/m2 Estimated body mass index is 24.22 kg/(m^2) as calculated from the following:    Height as of this encounter: 5' 9\" (1.753 m).    Weight as of this encounter: 164 lb (74.4 kg).  EXAM:   GENERAL: healthy, alert and no distress  NECK: no adenopathy, no asymmetry, masses, or scars and thyroid normal to palpation  RESP: lungs clear to auscultation - no rales, rhonchi or wheezes  CV: regular rate and rhythm, normal S1 S2, no S3 or S4, no murmur, click or rub, no peripheral edema and peripheral pulses strong  ABDOMEN: soft, nontender, no hepatosplenomegaly, no masses and bowel sounds normal  MS: no gross musculoskeletal defects noted, no edema    ASSESSMENT / PLAN:   1. Routine history and physical examination of adult  As below.    2. Hyperlipidemia LDL goal <130  Controlled.   - simvastatin (ZOCOR) 80 MG tablet; Take 1 tablet (80 mg) by mouth At Bedtime  Dispense: 90 tablet; Refill: 3  - Lipid panel reflex to direct LDL    3. Essential hypertension with goal blood pressure less than 140/90  Controlled. RTC in 6 months.  - lisinopril " "(PRINIVIL/ZESTRIL) 5 MG tablet; Take 1 tablet (5 mg) by mouth daily  Dispense: 90 tablet; Refill: 3  - Albumin Random Urine Quantitative with Creat Ratio    4. Iron deficiency anemia, unspecified iron deficiency anemia type    - ferrous sulfate (IRON) 325 (65 FE) MG tablet; Take 1 tablet (325 mg) by mouth 2 times daily  Dispense: 180 tablet; Refill: 3    5. Benign non-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    - terazosin (HYTRIN) 5 MG capsule; Take 1 capsule (5 mg) by mouth At Bedtime  Dispense: 90 capsule; Refill: 3    6. Osteoporosis, unspecified osteoporosis type, unspecified pathological fracture presence    - alendronate (FOSAMAX) 70 MG tablet; TAKE 1 TABLET (70MG) BY MOUTH EVERY 7 DAYS  Dispense: 12 tablet; Refill: 3    7. Insomnia, unspecified type    - zolpidem (AMBIEN) 10 MG tablet; Take 1 tablet (10 mg) by mouth nightly as needed for sleep  Dispense: 90 tablet; Refill: 3    End of Life Planning:  Patient currently has an advanced directive: No.  I have verified the patient's ablity to prepare an advanced directive/make health care decisions.  Literature was provided to assist patient in preparing an advanced directive.    COUNSELING:  Reviewed preventive health counseling, as reflected in patient instructions       Regular exercise       Healthy diet/nutrition       Vision screening        Estimated body mass index is 24.22 kg/(m^2) as calculated from the following:    Height as of this encounter: 5' 9\" (1.753 m).    Weight as of this encounter: 164 lb (74.4 kg).     reports that he has never smoked. He has never used smokeless tobacco.        Appropriate preventive services were discussed with this patient, including applicable screening as appropriate for cardiovascular disease, diabetes, osteopenia/osteoporosis, and glaucoma.  As appropriate for age/gender, discussed screening for colorectal cancer, prostate cancer, breast cancer, and cervical cancer. Checklist reviewing " preventive services available has been given to the patient.    Reviewed patients plan of care and provided an AVS. The Basic Care Plan (routine screening as documented in Health Maintenance) for Butch meets the Care Plan requirement. This Care Plan has been established and reviewed with the Patient.    Counseling Resources:  ATP IV Guidelines  Pooled Cohorts Equation Calculator  Breast Cancer Risk Calculator  FRAX Risk Assessment  ICSI Preventive Guidelines  Dietary Guidelines for Americans, 2010  USDA's MyPlate  ASA Prophylaxis  Lung CA Screening    Tate Hsu MD, MD  [facility]

## 2017-10-05 NOTE — NURSING NOTE
"Chief Complaint   Patient presents with     Physical     TERRA       Initial /71 (BP Location: Left arm, Patient Position: Chair, Cuff Size: Adult Regular)  Pulse 62  Temp 96.6  F (35.9  C) (Oral)  Ht 5' 9\" (1.753 m)  Wt 164 lb (74.4 kg)  SpO2 98%  BMI 24.22 kg/m2 Estimated body mass index is 24.22 kg/(m^2) as calculated from the following:    Height as of this encounter: 5' 9\" (1.753 m).    Weight as of this encounter: 164 lb (74.4 kg).  Medication Reconciliation: complete   Selena Acosta CMA      "

## 2017-10-05 NOTE — MR AVS SNAPSHOT
After Visit Summary   10/5/2017    Butch Carter    MRN: [id number]           Patient Information     Date Of Birth          10/9/1933        Visit Information        Provider Department      10/5/2017 10:40 AM Tate Hsu MD [facility]        Today's Diagnoses     Routine history and physical examination of adult    -  1    Hyperlipidemia LDL goal <130        Essential hypertension with goal blood pressure less than 140/90        Iron deficiency anemia, unspecified iron deficiency anemia type        Benign non-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        Osteoporosis, unspecified osteoporosis type, unspecified pathological fracture presence          Care Instructions      Preventive Health Recommendations:       Male Ages 65 and over    Yearly exam:             See your health care provider every year in order to  o   Review health changes.   o   Discuss preventive care.    o   Review your medicines if your doctor has prescribed any.    Talk with your health care provider about whether you should have a test to screen for prostate cancer (PSA).    Every 3 years, have a diabetes test (fasting glucose). If you are at risk for diabetes, you should have this test more often.    Every 5 years, have a cholesterol test. Have this test more often if you are at risk for high cholesterol or heart disease.     Every 10 years, have a colonoscopy. Or, have a yearly FIT test (stool test). These exams will check for colon cancer.    Talk to with your health care provider about screening for Abdominal Aortic Aneurysm if you have a family history of AAA or have a history of smoking.  Shots:     Get a flu shot each year.     Get a tetanus shot every 10 years.     Talk to your doctor about your pneumonia vaccines. There are now two you should receive - Pneumovax (PPSV 23) and Prevnar (PCV 13).    Talk to your doctor about a shingles vaccine.     Talk to your doctor about the  "hepatitis B vaccine.  Nutrition:     Eat at least 5 servings of fruits and vegetables each day.     Eat whole-grain bread, whole-wheat pasta and brown rice instead of white grains and rice.     Talk to your doctor about Calcium and Vitamin D.   Lifestyle    Exercise for at least 150 minutes a week (30 minutes a day, 5 days a week). This will help you control your weight and prevent disease.     Limit alcohol to one drink per day.     No smoking.     Wear sunscreen to prevent skin cancer.     See your dentist every six months for an exam and cleaning.     See your eye doctor every 1 to 2 years to screen for conditions such as glaucoma, macular degeneration and cataracts.          Follow-ups after your visit        Who to contact     If you have questions or need follow up information about today's clinic visit or your schedule please contact [facility] directly at [phone].  Normal or non-critical lab and imaging results will be communicated to you by MyChart, letter or phone within 4 business days after the clinic has received the results. If you do not hear from us within 7 days, please contact the clinic through [employer] or phone. If you have a critical or abnormal lab result, we will notify you by phone as soon as possible.  Submit refill requests through [employer] or call your pharmacy and they will forward the refill request to us. Please allow 3 business days for your refill to be completed.          Additional Information About Your Visit        MyChart Information     [employer] lets you send messages to your doctor, view your test results, renew your prescriptions, schedule appointments and more. To sign up, go to [email or web address] . Click on \"Log in\" on the left side of the screen, which will take you to the Welcome page. Then click on \"Sign up Now\" on the right side of the page.     You will be asked to enter the access code listed below, as well as some personal information. " "Please follow the directions to create your username and password.     Your access code is: [email or web address]  Expires: 1/3/2018 10:56 AM     Your access code will  in 90 days. If you need help or a new code, please call your [facility] clinic or [phone].        Care EveryWhere ID     This is your Care EveryWhere ID. This could be used by other organizations to access your [facility] medical records  [id number]        Your Vitals Were     Pulse Temperature Height Pulse Oximetry BMI (Body Mass Index)       62 96.6  F (35.9  C) (Oral) 5' 9\" (1.753 m) 98% 24.22 kg/m2        Blood Pressure from Last 3 Encounters:   10/05/17 128/71   17 128/72   17 108/63    Weight from Last 3 Encounters:   10/05/17 164 lb (74.4 kg)   17 162 lb (73.5 kg)   17 162 lb (73.5 kg)              We Performed the Following     Albumin Random Urine Quantitative with Creat Ratio     Lipid panel reflex to direct LDL          Today's Medication Changes          These changes are accurate as of: 10/5/17 10:56 AM.  If you have any questions, ask your nurse or doctor.               These medicines have changed or have updated prescriptions.        Dose/Directions    alendronate 70 MG tablet   Commonly known as:  FOSAMAX   This may have changed:  See the new instructions.   Used for:  Osteoporosis, unspecified osteoporosis type, unspecified pathological fracture presence   Changed by:  Tate Hsu MD        TAKE 1 TABLET (70MG) BY MOUTH EVERY 7 DAYS   Quantity:  12 tablet   Refills:  3            Where to get your medicines      Some of these will need a paper prescription and others can be bought over the counter.  Ask your nurse if you have questions.     Bring a paper prescription for each of these medications     alendronate 70 MG tablet    ferrous sulfate 325 (65 FE) MG tablet    lisinopril 5 MG tablet    simvastatin 80 MG tablet    terazosin 5 MG capsule                Primary Care Provider Office Phone # Fax #    Tate WCIK " MD Shadi [phone]       [id number]  [facility] [address]        Equal Access to Services     KARIN GIBBS : Hadii aad ku hadmichellekannan Perez, laurada javiermoonha, juliota kavivienda miko, amanda jenniferin hayaaamber wilsonbrent alberto laarnoldamber medel. So [facility] [phone].    ATENCIÓN: Si habla español, tiene a bhakta disposición servicios gratuitos de asistencia lingüística. Llame al [phone].    We comply with applicable federal civil rights laws and [place] laws. We do not discriminate on the basis of race, color, national origin, age, disability, sex, sexual orientation, or gender identity.            Thank you!     Thank you for choosing [facility]  for your care. Our goal is always to provide you with excellent care. Hearing back from our patients is one way we can continue to improve our services. Please take a few minutes to complete the written survey that you may receive in the mail after your visit with us. Thank you!             Your Updated Medication List - Protect others around you: Learn how to safely use, store and throw away your medicines at www.disposemymeds.org.          This list is accurate as of: 10/5/17 10:56 AM.  Always use your most recent med list.                   Brand Name Dispense Instructions for use Diagnosis    alendronate 70 MG tablet    FOSAMAX    12 tablet    TAKE 1 TABLET (70MG) BY MOUTH EVERY 7 DAYS    Osteoporosis, unspecified osteoporosis type, unspecified pathological fracture presence       aspirin 81 MG tablet     100 tablet    Take 1 tablet (81 mg) by mouth daily *    Hypertension goal BP (blood pressure) < 140/90       B-12 PO           calcium carbonate 1250 MG tablet    OS-SUSHIL 500 mg [place]. [place]     Take 1 tablet by mouth 2 times daily        CENTRUM SILVER ADULT 50+ PO           ferrous sulfate 325 (65 FE) MG tablet    IRON    180 tablet    Take 1 tablet (325 mg) by mouth 2 times daily    Iron deficiency anemia, unspecified iron deficiency anemia  type       Fish Oil 1000 MG Cpdr      Take by mouth 2 times daily        GLUCOSAMINE CHONDR 1500 COMPLX PO           HYDROcodone-acetaminophen 5-325 MG per tablet    NORCO    30 tablet    Take 1-2 tablets by mouth every 4 hours as needed for other (Moderate to Severe Pain)    Acute post-operative pain       ibuprofen 600 MG tablet    ADVIL/MOTRIN    30 tablet    Take 1 tablet (600 mg) by mouth every 8 hours as needed for pain (mild)    Acute post-operative pain       lisinopril 5 MG tablet    PRINIVIL/ZESTRIL    90 tablet    Take 1 tablet (5 mg) by mouth daily    Essential hypertension with goal blood pressure less than 140/90       LUTEIN      daily        sildenafil 100 MG tablet    VIAGRA    20 tablet    Take 0.5-1 tablets ( mg) by mouth daily as needed for erectile dysfunction Take 30 min to 4 hours before intercourse.  Never use with nitroglycerin, terazosin or doxazosin.    Erectile dysfunction, unspecified erectile dysfunction type       simvastatin 80 MG tablet    ZOCOR    90 tablet    Take 1 tablet (80 mg) by mouth At Bedtime    Hyperlipidemia LDL goal <130       terazosin 5 MG capsule    HYTRIN    90 capsule    Take 1 capsule (5 mg) by mouth At Bedtime    Benign non-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       VITAMIN D (CHOLECALCIFEROL) PO      Take by mouth daily        zolpidem 10 MG tablet    AMBIEN    90 tablet    Take 1 tablet (10 mg) by mouth nightly as needed for sleep    Insomnia, unspecified type

## 2017-10-05 NOTE — LETTER
October 6, 2017      Butch Carter  [address]  [facility] [place] [address]        Dear ,    We are writing to inform you of your test results.    Your cholesterol was normal for you. Please follow up in 6 months for recheck.     Resulted Orders   Lipid panel reflex to direct LDL   Result Value Ref Range    Cholesterol 83 <200 mg/dL    Triglycerides 55 <150 mg/dL      Comment:      Fasting specimen    HDL Cholesterol 43 >39 mg/dL    LDL Cholesterol Calculated 29 <100 mg/dL      Comment:      Desirable:       <100 mg/dl    Non HDL Cholesterol 40 <130 mg/dL   Albumin Random Urine Quantitative with Creat Ratio   Result Value Ref Range    Creatinine Urine 78 mg/dL    Albumin Urine mg/L <5 mg/L    Albumin Urine mg/g Cr Unable to calculate due to low value 0 - 17 mg/g Cr       If you have any questions or concerns, please call the clinic at the number listed above.       Sincerely,        Tate Hsu MD

## 2017-11-03 ENCOUNTER — OFFICE VISIT (OUTPATIENT)
Dept: FAMILY MEDICINE | Facility: CLINIC | Age: 82
End: 2017-11-03
Payer: MEDICARE

## 2017-11-03 VITALS
OXYGEN SATURATION: 95 % | SYSTOLIC BLOOD PRESSURE: 136 MMHG | HEIGHT: 69 IN | BODY MASS INDEX: 24.44 KG/M2 | WEIGHT: 165 LBS | DIASTOLIC BLOOD PRESSURE: 68 MMHG | HEART RATE: 69 BPM | TEMPERATURE: 96.4 F

## 2017-11-03 DIAGNOSIS — R58 ECCHYMOSIS: Primary | ICD-10-CM

## 2017-11-03 PROCEDURE — 99213 OFFICE O/P EST LOW 20 MIN: CPT | Performed by: FAMILY MEDICINE

## 2017-11-03 ASSESSMENT — PAIN SCALES - GENERAL: PAINLEVEL: NO PAIN (0)

## 2017-11-03 NOTE — PROGRESS NOTES
SUBJECTIVE:   Butch Carter is a 84 year old male who presents to clinic today for the following health issues:      Rash  Onset: noticed this morning    Description:   Location: behind right thigh  Character: red, raised, dotty  Itching (Pruritis): no     Progression of Symptoms:  same    Accompanying Signs & Symptoms:  Fever: no   Body aches or joint pain: no   Sore throat symptoms: no   Recent cold symptoms: no     History:   Previous similar rash: no     Precipitating factors:   Exposure to similar rash: no   New exposures: None   Recent travel: no     Alleviating factors:  None.    Therapies Tried and outcome: None.      Problem list and histories reviewed & adjusted, as indicated.  Additional history: as documented    Patient Active Problem List   Diagnosis     Hyperlipidemia LDL goal <130     Osteoporosis     Advanced directives, counseling/discussion     ED (erectile dysfunction)     Benign non-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     Iron deficiency anemia     Insomnia, unspecified type     Essential hypertension with goal blood pressure less than 140/90     Erectile dysfunction, unspecified erectile dysfunction type     Past Surgical History:   Procedure Laterality Date     HERNIORRHAPHY VENTRAL N/A 8/2/2017    Procedure: HERNIORRHAPHY VENTRAL;  VENTRAL HERNIA REPAIR WITH MESH;  Surgeon: Hari Durham MD;  Location: [facility]     SURGICAL HISTORY OF -   5/04    Double Hernia     SURGICAL HISTORY OF -   2013    cataract OD       Social History   Substance Use Topics     Smoking status: Never Smoker     Smokeless tobacco: Never Used     Alcohol use No     Family History   Problem Relation Age of Onset     CANCER Mother      Hypertension Mother              Reviewed and updated as needed this visit by clinical staffTobacco  Allergies  Meds  Med Hx  Surg Hx  Fam Hx  Soc Hx      Reviewed and updated as needed this visit by Provider         ROS:  Constitutional, HEENT,  "cardiovascular, pulmonary, GI, , musculoskeletal, neuro, skin, endocrine and psych systems are negative, except as otherwise noted.      OBJECTIVE:   /68 (BP Location: Left arm, Patient Position: Chair, Cuff Size: Adult Large)  Pulse 69  Temp 96.4  F (35.8  C) (Oral)  Ht 5' 9\" (1.753 m)  Wt 165 lb (74.8 kg)  SpO2 95%  BMI 24.37 kg/m2  Body mass index is 24.37 kg/(m^2).  GENERAL: healthy, alert and no distress  NECK: no adenopathy, no asymmetry, masses, or scars and thyroid normal to palpation  RESP: lungs clear to auscultation - no rales, rhonchi or wheezes  CV: regular rate and rhythm, normal S1 S2, no S3 or S4, no murmur, click or rub, no peripheral edema and peripheral pulses strong  ABDOMEN: soft, nontender, no hepatosplenomegaly, no masses and bowel sounds normal  MS: no gross musculoskeletal defects noted, no edema  SKIN: right posterior proximal leg nonblanchable erythematous macules  Diagnostic Test Results:  none     ASSESSMENT/PLAN:     1. Ecchymosis  Secondary to aspirin and activities. Discussed that this is not shingles. May take a few weeks to improve. Patient asymptomatic. RTC as needed.      See Patient Instructions    Tate Hsu MD, MD  [facility]  "

## 2017-11-03 NOTE — NURSING NOTE
"Chief Complaint   Patient presents with     Derm Problem     rash behind right thigh       Initial /68 (BP Location: Left arm, Patient Position: Chair, Cuff Size: Adult Large)  Pulse 69  Temp 96.4  F (35.8  C) (Oral)  Ht 5' 9\" (1.753 m)  Wt 165 lb (74.8 kg)  SpO2 95%  BMI 24.37 kg/m2 Estimated body mass index is 24.37 kg/(m^2) as calculated from the following:    Height as of this encounter: 5' 9\" (1.753 m).    Weight as of this encounter: 165 lb (74.8 kg).  Medication Reconciliation: complete     Nettie Coleman MA      "

## 2017-11-03 NOTE — MR AVS SNAPSHOT
After Visit Summary   11/3/2017    Butch Carter    MRN: [id number]           Patient Information     Date Of Birth          10/9/1933        Visit Information        Provider Department      11/3/2017 8:40 AM Tate Hsu MD [facility]        Today's Diagnoses     Ecchymosis    -  1      Care Instructions    At [facility], we strive to deliver an exceptional experience to you, every time we see you.  If you receive a survey in the mail, please send us back your thoughts. We really do value your feedback.    Based on your medical history, these are the current health maintenance/preventive care services that you are due for (some may have been done at this visit.)  Health Maintenance Due   Topic Date Due     MEDICARE ANNUAL WELLNESS VISIT  10/09/1951     ADVANCE DIRECTIVE PLANNING Q5 YRS  09/25/2017         Suggested websites for health information:  Www.Shoopi : Up to date and easily searchable information on multiple topics.  Www.Xobni.gov : medication info, interactive tutorials, watch real surgeries online  Www.familydoctor.org : good info from the Academy of Family Physicians  Www.cdc.gov : public health info, travel advisories, epidemics (H1N1)  Www.aap.org : children's health info, normal development, vaccinations  Www.health[place]state[place] : [place] dept of health, public health issues in [place], N1N1    Your care team:                            Family Medicine Internal Medicine   MD Rudolph Escobar MD Shantel Branch-Fleming, MD Katya Georgiev PA-C Nam Ho, MD Pediatrics   MITZI Ramos, MD Bree Salmon CNP, MD Deborah Mielke, MD Kim Thein, APRN CNP      Clinic hours: Monday - Thursday 7 am-7 pm; Fridays 7 am-5 pm.   Urgent care: Monday - Friday 11 am-9 pm; Saturday and Sunday 9 am-5 pm.  Pharmacy : Monday -Thursday 8 am-8 pm; Friday 8 am-6 pm; Saturday and Sunday  "9 am-5 pm.     Clinic: [phone]   Pharmacy: [phone]            Follow-ups after your visit        Who to contact     If you have questions or need follow up information about today's clinic visit or your schedule please contact [facility] directly at [phone].  Normal or non-critical lab and imaging results will be communicated to you by MyChart, letter or phone within 4 business days after the clinic has received the results. If you do not hear from us within 7 days, please contact the clinic through DataPromhart or phone. If you have a critical or abnormal lab result, we will notify you by phone as soon as possible.  Submit refill requests through Hook Mobile or call your pharmacy and they will forward the refill request to us. Please allow 3 business days for your refill to be completed.          Additional Information About Your Visit        DataPromharZazoo Information     Hook Mobile lets you send messages to your doctor, view your test results, renew your prescriptions, schedule appointments and more. To sign up, go to [email or web address] . Click on \"Log in\" on the left side of the screen, which will take you to the Welcome page. Then click on \"Sign up Now\" on the right side of the page.     You will be asked to enter the access code listed below, as well as some personal information. Please follow the directions to create your username and password.     Your access code is: [email or web address]  Expires: 1/3/2018 10:56 AM     Your access code will  in 90 days. If you need help or a new code, please call your [facility] clinic or [phone].        Care EveryWhere ID     This is your Care EveryWhere ID. This could be used by other organizations to access your [facility] medical records  [id number]        Your Vitals Were     Pulse Temperature Height Pulse Oximetry BMI (Body Mass Index)       69 96.4  F (35.8  C) (Oral) 5' 9\" (1.753 m) 95% 24.37 kg/m2        Blood Pressure from Last 3 " Encounters:   11/03/17 136/68   10/05/17 128/71   08/02/17 128/72    Weight from Last 3 Encounters:   11/03/17 165 lb (74.8 kg)   10/05/17 164 lb (74.4 kg)   08/02/17 162 lb (73.5 kg)              Today, you had the following     No orders found for display       Primary Care Provider Office Phone # Fax #    Tate Hsu [phone]       [address]  [facility] [address]        Equal Access to Services     [facility]: Hadii aad ku hadasho Soomaali, waaxda luqadaha, qaybta kaalmada adeegyada, waxay kaelyn haydonna herrera . So [facility] [phone].    ATENCIÓN: Si habla español, tiene a bhakta disposición servicios gratuitos de asistencia lingüística. Ll[facility] [phone].    We comply with applicable federal civil rights laws and [place] laws. We do not discriminate on the basis of race, color, national origin, age, disability, sex, sexual orientation, or gender identity.            Thank you!     Thank you for choosing [facility]  for your care. Our goal is always to provide you with excellent care. Hearing back from our patients is one way we can continue to improve our services. Please take a few minutes to complete the written survey that you may receive in the mail after your visit with us. Thank you!             Your Updated Medication List - Protect others around you: Learn how to safely use, store and throw away your medicines at www.disposemymeds.org.          This list is accurate as of: 11/3/17  9:12 AM.  Always use your most recent med list.                   Brand Name Dispense Instructions for use Diagnosis    alendronate 70 MG tablet    FOSAMAX    12 tablet    TAKE 1 TABLET (70MG) BY MOUTH EVERY 7 DAYS    Osteoporosis, unspecified osteoporosis type, unspecified pathological fracture presence       aspirin 81 MG tablet     100 tablet    Take 1 tablet (81 mg) by mouth daily *    Hypertension goal BP (blood pressure) < 140/90       B-12 PO            calcium carbonate 1250 MG tablet    OS-SUSHIL 500 mg [place]. Ca     Take 1 tablet by mouth 2 times daily        CENTRUM SILVER ADULT 50+ PO           ferrous sulfate 325 (65 FE) MG tablet    IRON    180 tablet    Take 1 tablet (325 mg) by mouth 2 times daily    Iron deficiency anemia, unspecified iron deficiency anemia type       Fish Oil 1000 MG Cpdr      Take by mouth 2 times daily        GLUCOSAMINE CHONDR 1500 COMPLX PO           HYDROcodone-acetaminophen 5-325 MG per tablet    NORCO    30 tablet    Take 1-2 tablets by mouth every 4 hours as needed for other (Moderate to Severe Pain)    Acute post-operative pain       ibuprofen 600 MG tablet    ADVIL/MOTRIN    30 tablet    Take 1 tablet (600 mg) by mouth every 8 hours as needed for pain (mild)    Acute post-operative pain       lisinopril 5 MG tablet    PRINIVIL/ZESTRIL    90 tablet    Take 1 tablet (5 mg) by mouth daily    Essential hypertension with goal blood pressure less than 140/90       LUTEIN      daily        sildenafil 100 MG tablet    VIAGRA    20 tablet    Take 0.5-1 tablets ( mg) by mouth daily as needed for erectile dysfunction Take 30 min to 4 hours before intercourse.  Never use with nitroglycerin, terazosin or doxazosin.    Erectile dysfunction, unspecified erectile dysfunction type       simvastatin 80 MG tablet    ZOCOR    90 tablet    Take 1 tablet (80 mg) by mouth At Bedtime    Hyperlipidemia LDL goal <130       terazosin 5 MG capsule    HYTRIN    90 capsule    Take 1 capsule (5 mg) by mouth At Bedtime    Benign non-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       VITAMIN D (CHOLECALCIFEROL) PO      Take by mouth daily        zolpidem 10 MG tablet    AMBIEN    90 tablet    Take 1 tablet (10 mg) by mouth nightly as needed for sleep    Insomnia, unspecified type

## 2017-11-03 NOTE — PATIENT INSTRUCTIONS
At [facility], we strive to deliver an exceptional experience to you, every time we see you.  If you receive a survey in the mail, please send us back your thoughts. We really do value your feedback.    Based on your medical history, these are the current health maintenance/preventive care services that you are due for (some may have been done at this visit.)  Health Maintenance Due   Topic Date Due     MEDICARE ANNUAL WELLNESS VISIT  10/09/1951     ADVANCE DIRECTIVE PLANNING Q5 YRS  09/25/2017         Suggested websites for health information:  Www.CloudAcademy.org : Up to date and easily searchable information on multiple topics.  Www.medlineplus.gov : medication info, interactive tutorials, watch real surgeries online  Www.familydoctor.org : good info from the Academy of Family Physicians  Www.cdc.gov : public health info, travel advisories, epidemics (H1N1)  Www.aap.org : children's health info, normal development, vaccinations  Www.health.[facility].[place].us : [place] dept of health, public health issues in [place], N1N1    Your care team:                            Family Medicine Internal Medicine   MD Rudolph Escobar MD Shantel Branch-Fleming, MD Katya Georgiev PA-C Nam Ho, MD Pediatrics   MITZI Ramos, VISH NEAL CNP   MD Bree Gallego MD Deborah Mielke, MD Kim Thein, APRN CNP      Clinic hours: Monday - Thursday 7 am-7 pm; Fridays 7 am-5 pm.   Urgent care: Monday - Friday 11 am-9 pm; Saturday and Sunday 9 am-5 pm.  Pharmacy : Monday -Thursday 8 am-8 pm; Friday 8 am-6 pm; Saturday and Sunday 9 am-5 pm.     Clinic: [phone]   Pharmacy: [phone]

## 2018-02-10 DIAGNOSIS — M81.0 AGE RELATED OSTEOPOROSIS, UNSPECIFIED PATHOLOGICAL FRACTURE PRESENCE: ICD-10-CM

## 2018-02-10 NOTE — TELEPHONE ENCOUNTER
"Requested Prescriptions   Pending Prescriptions Disp Refills     alendronate (FOSAMAX) 70 MG tablet [Pharmacy Med Name: ALENDRONATE 70MG    TAB] 12 tablet 3    Last Written Prescription Date:  10/5/17  Last Fill Quantity: 12,  # refills: 3   Last Office Visit with G, P or [facility] prescribing provider: 11/3/17     Future Office Visit:      Sig: TAKE 1 TABLET BY MOUTH EVERY 7 DAYS    Bisphosphonates Failed    2/10/2018 11:18 AM       Failed - Dexa on file within past 2 years    Please review last Dexa result.          Passed - Recent or future visit with authorizing provider's specialty    Patient had office visit in the last year or has a visit in the next 30 days with authorizing provider.  See \"Patient Info\" tab in inbasket, or \"Choose Columns\" in Meds & Orders section of the refill encounter.            Passed - Patient is age 18 or older       Passed - Normal Serum Creatinine on file within past 12 months    Recent Labs   Lab Test  07/18/17   1107   CR  0.81               "

## 2018-02-15 RX ORDER — ALENDRONATE SODIUM 70 MG/1
TABLET ORAL
Qty: 12 TABLET | Refills: 3 | Status: SHIPPED | OUTPATIENT
Start: 2018-02-15 | End: 2019-03-18

## 2018-02-15 NOTE — TELEPHONE ENCOUNTER
RN unable to fill refill request per protocol.    Shakira Yap RN, [facility]

## 2018-09-12 ENCOUNTER — RADIANT APPOINTMENT (OUTPATIENT)
Dept: GENERAL RADIOLOGY | Facility: CLINIC | Age: 83
End: 2018-09-12
Attending: ORTHOPAEDIC SURGERY
Payer: MEDICARE

## 2018-09-12 ENCOUNTER — OFFICE VISIT (OUTPATIENT)
Dept: ORTHOPEDICS | Facility: CLINIC | Age: 83
End: 2018-09-12
Payer: MEDICARE

## 2018-09-12 VITALS
SYSTOLIC BLOOD PRESSURE: 129 MMHG | HEART RATE: 79 BPM | HEIGHT: 69 IN | OXYGEN SATURATION: 98 % | DIASTOLIC BLOOD PRESSURE: 68 MMHG | WEIGHT: 165.2 LBS | BODY MASS INDEX: 24.47 KG/M2

## 2018-09-12 DIAGNOSIS — M25.561 CHRONIC PAIN OF RIGHT KNEE: ICD-10-CM

## 2018-09-12 DIAGNOSIS — G89.29 CHRONIC PAIN OF RIGHT KNEE: ICD-10-CM

## 2018-09-12 DIAGNOSIS — M17.11 PRIMARY OSTEOARTHRITIS OF RIGHT KNEE: Primary | ICD-10-CM

## 2018-09-12 PROCEDURE — 20610 DRAIN/INJ JOINT/BURSA W/O US: CPT | Mod: RT | Performed by: ORTHOPAEDIC SURGERY

## 2018-09-12 PROCEDURE — 99204 OFFICE O/P NEW MOD 45 MIN: CPT | Mod: 25 | Performed by: ORTHOPAEDIC SURGERY

## 2018-09-12 PROCEDURE — 73562 X-RAY EXAM OF KNEE 3: CPT | Mod: RT

## 2018-09-12 RX ORDER — LIDOCAINE HYDROCHLORIDE 10 MG/ML
4 INJECTION, SOLUTION INFILTRATION; PERINEURAL
Status: SHIPPED | OUTPATIENT
Start: 2018-09-12

## 2018-09-12 RX ORDER — METHYLPREDNISOLONE ACETATE 80 MG/ML
80 INJECTION, SUSPENSION INTRA-ARTICULAR; INTRALESIONAL; INTRAMUSCULAR; SOFT TISSUE
Status: SHIPPED | OUTPATIENT
Start: 2018-09-12

## 2018-09-12 RX ORDER — BUPIVACAINE HYDROCHLORIDE 2.5 MG/ML
3 INJECTION, SOLUTION INFILTRATION; PERINEURAL
Status: SHIPPED | OUTPATIENT
Start: 2018-09-12

## 2018-09-12 RX ADMIN — METHYLPREDNISOLONE ACETATE 80 MG: 80 INJECTION, SUSPENSION INTRA-ARTICULAR; INTRALESIONAL; INTRAMUSCULAR; SOFT TISSUE at 13:56

## 2018-09-12 RX ADMIN — BUPIVACAINE HYDROCHLORIDE 3 ML: 2.5 INJECTION, SOLUTION INFILTRATION; PERINEURAL at 13:56

## 2018-09-12 RX ADMIN — LIDOCAINE HYDROCHLORIDE 4 ML: 10 INJECTION, SOLUTION INFILTRATION; PERINEURAL at 13:56

## 2018-09-12 ASSESSMENT — PAIN SCALES - GENERAL: PAINLEVEL: MODERATE PAIN (5)

## 2018-09-12 NOTE — PROGRESS NOTES
CHIEF COMPLAINT:   Chief Complaint   Patient presents with     Right Knee - Pain     Onset: years. NKI. Pain has gotten progressively worse. He thought if he got a cortisone shot it would ease the pain a little bit. Pain is over the entire knee when walking. He has had injections before, have helped, years ago.      Butch Carter is seen today in the [facility] for evaluation of right knee pain at the request of Dr. Ida Bowles     HISTORY OF PRESENT ILLNESS    Butch Carter is a 84 year old male seen for evaluation of ongoing right knee pain with no known injury. Pain has been present for years and has gotten progressively worse over time. Presents today with moderate pain, 5/10. Diffuse anterior knee pain with occasional medial knee pain.  Pain with weight bearing activities, no pain at rest, sitting. Has had a cortisone injection in the past many years ago. Unclear when or who he had the injection with as well as how effective it was.     Present symptoms: moderate pain  , anterior/medial, no swelling. No locking, catching or giving way.  Pain severity: 5/10  Frequency of symptoms: frequently  Exacerbating Factors: weight bearing  Relieving Factors: rest  Night Pain: No  Pain while at rest: No   Numbness or tingling: No   Patient has tried:     NSAIDS: No      Physical Therapy: No      Activity modification: No      Bracing: No      Injections: Yes, years ago.     Ice: No      Assistive device:  No     Other: none    Orthopaedic PMH: known knee osteoarthritis     Other PMH:  has a past medical history of Hyperlipidemia LDL goal <130 (9/21/2011) and Hypertension goal BP (blood pressure) < 140/90 (9/21/2011).  Patient Active Problem List    Diagnosis Date Noted     Erectile dysfunction, unspecified erectile dysfunction type 11/01/2016     Priority: Medium     Essential hypertension with goal blood pressure less than 140/90 07/13/2016     Priority: Medium     Insomnia, unspecified  type 06/01/2016     Priority: Medium     Benign non-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 11/13/2015     Priority: Medium     Iron deficiency anemia 11/13/2015     Priority: Medium     ED (erectile dysfunction) 05/17/2013     Priority: Medium     Advanced directives, counseling/discussion 09/25/2012     Priority: Medium     Discussed advance care planning with patient; information given to patient to review. 9/25/2012     ZAYNAB Goodman MA           Hyperlipidemia LDL goal <130 09/21/2011     Priority: Medium     Osteoporosis 09/21/2011     Priority: Medium       Surgical Hx:  has a past surgical history that includes surgical history of -  (5/04); surgical history of -  (2013); and Herniorrhaphy ventral (N/A, 8/2/2017).    Medications:   Current Outpatient Prescriptions:      alendronate (FOSAMAX) 70 MG tablet, TAKE 1 TABLET BY MOUTH EVERY 7 DAYS, Disp: 12 tablet, Rfl: 3     alendronate (FOSAMAX) 70 MG tablet, TAKE 1 TABLET (70MG) BY MOUTH EVERY 7 DAYS, Disp: 12 tablet, Rfl: 3     aspirin 81 MG tablet, Take 1 tablet (81 mg) by mouth daily *, Disp: 100 tablet, Rfl: 1     calcium carbonate (OS-SUSHIL 500 MG [place]. [place]) 1250 MG tablet, Take 1 tablet by mouth 2 times daily, Disp: , Rfl:      Cyanocobalamin (B-12 PO), , Disp: , Rfl:      ferrous sulfate (IRON) 325 (65 FE) MG tablet, Take 1 tablet (325 mg) by mouth 2 times daily, Disp: 180 tablet, Rfl: 3     Glucosamine-Chondroit-Vit C-Mn (GLUCOSAMINE CHONDR 1500 COMPLX PO), , Disp: , Rfl:      HYDROcodone-acetaminophen (NORCO) 5-325 MG per tablet, Take 1-2 tablets by mouth every 4 hours as needed for other (Moderate to Severe Pain), Disp: 30 tablet, Rfl: 0     ibuprofen (ADVIL/MOTRIN) 600 MG tablet, Take 1 tablet (600 mg) by mouth every 8 hours as needed for pain (mild), Disp: 30 tablet, Rfl: 0     lisinopril (PRINIVIL/ZESTRIL) 5 MG tablet, Take 1 tablet (5 mg) by mouth daily, Disp: 90 tablet, Rfl: 3     LUTEIN, daily, Disp: , Rfl:      Multiple  "Vitamins-Minerals (CENTRUM SILVER ADULT 50+ PO), , Disp: , Rfl:      Omega-3 Fatty Acids (FISH OIL) 1000 MG CPDR, Take by mouth 2 times daily, Disp: , Rfl:      sildenafil (VIAGRA) 100 MG tablet, Take 0.5-1 tablets ( mg) by mouth daily as needed for erectile dysfunction Take 30 min to 4 hours before intercourse.  Never use with nitroglycerin, terazosin or doxazosin., Disp: 20 tablet, Rfl: 5     simvastatin (ZOCOR) 80 MG tablet, Take 1 tablet (80 mg) by mouth At Bedtime, Disp: 90 tablet, Rfl: 3     terazosin (HYTRIN) 5 MG capsule, Take 1 capsule (5 mg) by mouth At Bedtime, Disp: 90 capsule, Rfl: 3     VITAMIN D, CHOLECALCIFEROL, PO, Take by mouth daily, Disp: , Rfl:      zolpidem (AMBIEN) 10 MG tablet, Take 1 tablet (10 mg) by mouth nightly as needed for sleep, Disp: 90 tablet, Rfl: 3    Allergies: No Known Allergies    Social Hx: retired   reports that he has never smoked. He has never used smokeless tobacco. He reports that he does not drink alcohol or use illicit drugs.    Family Hx: family history includes Cancer in his mother; Hypertension in his mother.    REVIEW OF SYSTEMS: 10 point ROS neg other than the symptoms noted above in the HPI and PMH. Notables include  CONSTITUTIONAL:NEGATIVE for fever, chills, change in weight  INTEGUMENTARY/SKIN: NEGATIVE for worrisome rashes, moles or lesions  MUSCULOSKELETAL:See HPI above  NEURO: NEGATIVE for weakness, dizziness or paresthesias    This document serves as a record of the services and decisions personally performed and made by Faisal Castro MD. It was created on his behalf by Yuli Carrillo, a trained medical scribe. The creation of this document is based the provider's statements to the medical scribe.    Scribnatan Carrillo 1:30 PM 9/12/2018    PHYSICAL EXAM:  /68  Pulse 79  Ht 1.753 m (5' 9\")  Wt 74.9 kg (165 lb 3.2 oz)  SpO2 98%  BMI 24.4 kg/m2   GENERAL APPEARANCE: elderly, alert, no distress  SKIN: no suspicious lesions or rashes  NEURO: Normal " strength and tone, mentation intact and speech normal  PSYCH:  mentation appears normal and affect normal, not anxious  RESPIRATORY: No increased work of breathing.  HANDS: no clubbing, nail pitting.    BILATERAL LOWER EXTREMITIES:  Gait: normal  Alignment: varus  No gross deformities or masses.  Bilateral Quad atrophy, strength normal.  Intact sensation deep peroneal nerve, superficial peroneal nerve, med/lat tibial nerve, sural nerve, saphenous nerve  Intact EHL, EDL, TA, FHL, GS, quadriceps hamstrings and hip flexors  Toes warm and well perfused, brisk capillary refill. Palpable 2+ dp pulses.  Bilateral calf soft and nttp or squeeze.  No palpable popliteal lymphadenopathy.  DTRs: achilles 2+, patella 2+.  Edema: trace  Hips with full, pain-free motion. No irritability with flexion, adduction, and internal rotation.    LEFT KNEE EXAM:    Skin: intact, no ecchymosis or erythema, varicose veins  Squat: 100 %, not limited by pain.     ROM: 0 extension to 135 flexion  Tight hamstrings on straight leg raise.  Effusion: trace  Tender: NTTP med/lat joint line, anterior or posterior knee  McMurrays: negative    MCL: stable, and non-painful at both 0 and 30 degrees knee flexion  Varus stress: stable, and non-painful at both 0 and 30 degrees knee flexion  Lachmans: neg, firm endpoint  Posterior Drawer stable  Patellofemoral joint:                Apprehension: negative              Crepitations: mild   Grind: negative     RIGHT KNEE EXAM:    Skin: intact, no ecchymosis or erythema, varicose veins  Squat: 100 %, some anterior pressure    ROM: 1 extension to 135 flexion  Tight hamstrings on straight leg raise.  Effusion: trace  Tender: NTTP med/lat joint line, anterior or posterior knee  McMurrays: negative    MCL: stable, and non-painful at both 0 and 30 degrees knee flexion  Varus stress: stable, and non-painful at both 0 and 30 degrees knee flexion  Lachmans: neg, firm endpoint  Posterior Drawer stable  Patellofemoral  "joint:                Apprehension: negative              Crepitations: mild   Grind: mild    X-RAY:  3 views right knee from 9/12/2018 were reviewed in clinic today. On my review, no obvious fractures or dislocations. There is moderate right knee medial joint space narrowing, near bone on bone based on lateral view. Small marginal osteophytes. Mild patello-femoral degenerative changes with marginal osteophytes medially. Slight progression since xrays 2013        Impression:   84 year old male with chronic right knee pain, right knee primary osteoarthritis     Plan:   * reviewed imaging studies with patient, showing arthritis. Arthritis is wearing of the cartilage due to longstanding \"wear and tear\" or can follow an injury to the joint.    Discussed typical symptoms of arthritic pain is pain aggravated with weight bearing activities, stiffness, relieved by sitting or rest. Swelling may be associated. It is common to have some grinding and popping in the knee with arthritis.    Discussed various treatments for degenerative arthrosis of the knee, including nonoperative and operative approaches. Nonoperative approaches, which are exhausted prior to operative treatment, include doing nothing and living with the pain as patient has been doing, activity modification (avoid aggravating activities), physical therapy and strengthening exercises, weight loss, anti-inflammatory medications, bracing, ambulatory aids (cane, walker) and injections. Once these have been tried and are deemed unsuccessful with a good effort, and the patient is an appropriate candidate, the next treatment would be knee arthroplasty or replacement. Depending on the location of the arthritis, knee replacement can be partial (one side of the knee affected by arthritis) or a total knee arthroplasty (all 3 compartments). The risks, perceived benefits and perioperative rehabilitation expectations of knee arthroplasty were discussed in detail. Also " "discussed that approximately 10% of patients that undergo knee arthroplasty are not happy following surgery and may have worse pain or no improvement in pain, contrary to their preoperative expectations.    At this time, nonoperative treatment will be pursued.  Non-surgical treatment for knee arthritis includes:    * rest, sitting  * Activity modification - avoid impact activities or activities that aggravate symptoms.  * NSAIDS (non-steroidal anti-inflammatory medications; e.g. Aleve, advil, motrin, ibuprofen) - regular use for inflammation ( twice daily or three times daily), with food, as long as no contra-indications Please discuss with primary care doctor if needed  * ice, 15-20 minutes at a time several times a day or as needed.  * Strengthening of quadriceps muscles  * Physical Therapy for strengthening, stretching and range of motion exercises of legs  * Tylenol as needed for pain, consider Tylenol arthritis or similar  * Weight loss: Weight loss:  Body mass index is 24.4 kg/(m^2).. weight loss benefits, not only for the current pain symptoms, but also overall health. Recommend a good diet plan that works for the patient, with the assistance of a dietician or primary care doctor as needed. Also, a good, low-impact exercise program for at least 20 minutes per day, 3 times per week, such as exercise bike, elliptical , or pool.  * Exercise: low impact such as stationary bike, elliptical, pool.  * Injections: cortisone versus viscosupplementation (hyaluronic acid, \"rooster comb\", \"gel shots\"); risks and perceived benefits discussed today. Patient elects to proceed.  * Bracing: bracing the knee may offer some relief of symptoms when worn and provide some stability.  * over the counter supplements such as glucosamine and chondroitin sulfate may help with joint pain.  * topical ointments may help as well    Patient to follow up with Primary Care provider regarding elevated blood pressure.    * return to " clinic as needed.    PROCEDURE NOTE:  The risks, perceived benefits and potential complications (including but not limited to: bleeding, infection, pain, scar, damage to adjacent structures, atrophy or necrosis of soft tissue, skin blanching, failure to relieve symptoms, worsening of symptoms, allergic reaction) of injection were discussed with the patient. Questions were addressed and answered.The patient elected to proceed. Written informed consent was obtained. The correct procedural site was identified and confirmed. A RIGHT knee intraarticular injection was performed using 1mL Depo Medrol 80mg per mL and 7mL (4mL 1% lidocaine, 3mL 0.25% marcaine)  of local anesthetic after sterile prep, to the correct procedural site. Sterile bandaid applied. This was tolerated well by the patient. No apparent complications. Did also discuss that if diabetic, recommend close monitoring of blood sugars over the next week as cortisone injections can temporarily elevate blood sugars.        The information in this document, created by a scribe for me, accurately reflects the services I personally performed and the decisions made by me. I have reviewed and approved this document for accuracy.     Faisal Castro M.D., M.S.  Dept. of Orthopaedic Surgery  [facility]    Large Joint Injection/Arthocentesis  Date/Time: 9/12/2018 1:56 PM  Performed by: JUAN MINOR  Authorized by: FAISAL CASTRO DAMON     Indications:  Pain  Needle Size:  22 G  Guidance: landmark guided    Approach:  Anteromedial  Location:  Knee  Site:  R knee joint  Medications:  4 mL lidocaine 1 %; 80 mg methylPREDNISolone acetate 80 MG/ML; 3 mL bupivacaine 0.25 %  Consent Given by:  Patient  Prep: patient was prepped and draped in usual sterile fashion

## 2018-09-12 NOTE — MR AVS SNAPSHOT
After Visit Summary   9/12/2018    Butch Carter    MRN: [id number]           Patient Information     Date Of Birth          10/9/1933        Visit Information        Provider Department      9/12/2018 1:30 PM Faisal Castro MD [facility]        Today's Diagnoses     Primary osteoarthritis of right knee    -  1    Chronic pain of right knee           Follow-ups after your visit        Additional Services     [facility] PT, HAND, AND CHIROPRACTIC REFERRAL       **This order will print in the [facility] Scheduling Office**    Physical Therapy, Hand Therapy and Chiropractic Care are available through:    *[facility]  *[facility]  *[facility]    Call one number to schedule at any of the above locations: [phone].    Your provider has referred you to: Physical Therapy at [facility] or [facility]    Indication/Reason for Referral: Knee Pain  Onset of Illness: years  Therapy Orders: Evaluate and Treat  Special Programs: None  Special Request: None    Bailee Morocho      Additional Comments for the Therapist or Chiropractor:     Please be aware that coverage of these services is subject to the terms and limitations of your health insurance plan.  Call member services at your health plan with any benefit or coverage questions.      Please bring the following to your appointment:    *Your personal calendar for scheduling future appointments  *Comfortable clothing                  Follow-up notes from your care team     Return if symptoms worsen or fail to improve.      Your next 10 appointments already scheduled     Sep 19, 2018  8:40 AM CDT   [facility]su [facility]    [address] [place] [id number] [phone]           Bring a current list of meds and any records pertaining to this visit. For Physicals, please bring immunization records and any forms  "needing to be filled out. Please arrive 10 minutes early to complete paperwork.              Who to contact     If you have questions or need follow up information about today's clinic visit or your schedule please contact [facility] directly at [phone].  Normal or non-critical lab and imaging results will be communicated to you by MyChart, letter or phone within 4 business days after the clinic has received the results. If you do not hear from us within 7 days, please contact the clinic through [employer] or phone. If you have a critical or abnormal lab result, we will notify you by phone as soon as possible.  Submit refill requests through [employer] or call your pharmacy and they will forward the refill request to us. Please allow 3 business days for your refill to be completed.          Additional Information About Your Visit        [employer] Information     [employer] lets you send messages to your doctor, view your test results, renew your prescriptions, schedule appointments and more. To sign up, go to [email or web address] . Click on \"Log in\" on the left side of the screen, which will take you to the Welcome page. Then click on \"Sign up Now\" on the right side of the page.     You will be asked to enter the access code listed below, as well as some personal information. Please follow the directions to create your username and password.     Your access code is: [email or web address]  Expires: 2018  2:31 PM     Your access code will  in 90 days. If you need help or a new code, please call your [place] clinic or [phone].        Care EveryWhere ID     This is your Care EveryWhere ID. This could be used by other organizations to access your [place] medical records  [id number]        Your Vitals Were     Pulse Height Pulse Oximetry BMI (Body Mass Index)          79 5' 9\" (1.753 m) 98% 24.4 kg/m2         Blood Pressure from Last 3 Encounters:   18 129/68   17 136/68   10/05/17 " 128/71    Weight from Last 3 Encounters:   09/12/18 165 lb 3.2 oz (74.9 kg)   11/03/17 165 lb (74.8 kg)   10/05/17 164 lb (74.4 kg)              We Performed the Following     [facility] PT, HAND, AND CHIROPRACTIC REFERRAL     Large Joint Injection/Arthocentesis        Primary Care Provider Office Phone # Fax #    Tate Hsu [phone]       [address]  [facility] [address]        Equal Access to Services     [facility]: Hadii aad ku hadasho Soomaali, waaxda luqadaha, qaybta kaalmada adeegyada, waxay idiin hayaan adeeg kharash laron . So [facility] [phone].    ATENCIÓN: Si habla español, tiene a bhakta disposición servicios gratuitos de asistencia lingüística. Llame al [phone].    We comply with applicable federal civil rights laws and [place] laws. We do not discriminate on the basis of race, color, national origin, age, disability, sex, sexual orientation, or gender identity.            Thank you!     Thank you for choosing [facility]  for your care. Our goal is always to provide you with excellent care. Hearing back from our patients is one way we can continue to improve our services. Please take a few minutes to complete the written survey that you may receive in the mail after your visit with us. Thank you!             Your Updated Medication List - Protect others around you: Learn how to safely use, store and throw away your medicines at www.disposemymeds.org.          This list is accurate as of 9/12/18  2:31 PM.  Always use your most recent med list.                   Brand Name Dispense Instructions for use Diagnosis    * alendronate 70 MG tablet    FOSAMAX    12 tablet    TAKE 1 TABLET (70MG) BY MOUTH EVERY 7 DAYS    Osteoporosis, unspecified osteoporosis type, unspecified pathological fracture presence       * alendronate 70 MG tablet    FOSAMAX    12 tablet    TAKE 1 TABLET BY MOUTH EVERY 7 DAYS    Age related osteoporosis, unspecified pathological fracture  presence       aspirin 81 MG tablet     100 tablet    Take 1 tablet (81 mg) by mouth daily *    Hypertension goal BP (blood pressure) < 140/90       B-12 PO           calcium carbonate 500 mg {elemental} 500 MG tablet    OS-SUSHIL     Take 1 tablet by mouth 2 times daily        CENTRUM SILVER ADULT 50+ PO           ferrous sulfate 325 (65 Fe) MG tablet    IRON    180 tablet    Take 1 tablet (325 mg) by mouth 2 times daily    Iron deficiency anemia, unspecified iron deficiency anemia type       Fish Oil 1000 MG Cpdr      Take by mouth 2 times daily        GLUCOSAMINE CHONDR 1500 COMPLX PO           HYDROcodone-acetaminophen 5-325 MG per tablet    NORCO    30 tablet    Take 1-2 tablets by mouth every 4 hours as needed for other (Moderate to Severe Pain)    Acute post-operative pain       ibuprofen 600 MG tablet    ADVIL/MOTRIN    30 tablet    Take 1 tablet (600 mg) by mouth every 8 hours as needed for pain (mild)    Acute post-operative pain       lisinopril 5 MG tablet    PRINIVIL/ZESTRIL    90 tablet    Take 1 tablet (5 mg) by mouth daily    Essential hypertension with goal blood pressure less than 140/90       LUTEIN      daily        sildenafil 100 MG tablet    VIAGRA    20 tablet    Take 0.5-1 tablets ( mg) by mouth daily as needed for erectile dysfunction Take 30 min to 4 hours before intercourse.  Never use with nitroglycerin, terazosin or doxazosin.    Erectile dysfunction, unspecified erectile dysfunction type       simvastatin 80 MG tablet    ZOCOR    90 tablet    Take 1 tablet (80 mg) by mouth At Bedtime    Hyperlipidemia LDL goal <130       terazosin 5 MG capsule    HYTRIN    90 capsule    Take 1 capsule (5 mg) by mouth At Bedtime    Benign non-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       VITAMIN D (CHOLECALCIFEROL) PO      Take by mouth daily        zolpidem 10 MG tablet    AMBIEN    90 tablet    Take 1 tablet (10 mg) by mouth nightly as needed for sleep    Insomnia,  unspecified type       * Notice:  This list has 2 medication(s) that are the same as other medications prescribed for you. Read the directions carefully, and ask your doctor or other care provider to review them with you.

## 2018-09-12 NOTE — LETTER
9/12/2018         RE: Butch Carter  [address]  [place] [address]        Dear Colleague,    Thank you for referring your patient, Butch Carter, to the [facility]. Please see a copy of my visit note below.    CHIEF COMPLAINT:   Chief Complaint   Patient presents with     Right Knee - Pain     Onset: years. NKI. Pain has gotten progressively worse. He thought if he got a cortisone shot it would ease the pain a little bit. Pain is over the entire knee when walking. He has had injections before, have helped, years ago.      Butch Carter is seen today in the [facility] for evaluation of right knee pain at the request of Dr. Ida Bowles     HISTORY OF PRESENT ILLNESS    Butch Carter is a 84 year old male seen for evaluation of ongoing right knee pain with no known injury. Pain has been present for years and has gotten progressively worse over time. Presents today with moderate pain, 5/10. Diffuse anterior knee pain with occasional medial knee pain.  Pain with weight bearing activities, no pain at rest, sitting. Has had a cortisone injection in the past many years ago. Unclear when or who he had the injection with as well as how effective it was.     Present symptoms: moderate pain  , anterior/medial, no swelling. No locking, catching or giving way.  Pain severity: 5/10  Frequency of symptoms: frequently  Exacerbating Factors: weight bearing  Relieving Factors: rest  Night Pain: No  Pain while at rest: No   Numbness or tingling: No   Patient has tried:     NSAIDS: No      Physical Therapy: No      Activity modification: No      Bracing: No      Injections: Yes, years ago.     Ice: No      Assistive device:  No     Other: none    Orthopaedic PMH: known knee osteoarthritis     Other PMH:  has a past medical history of Hyperlipidemia LDL goal <130 (9/21/2011) and Hypertension goal BP (blood pressure) < 140/90 (9/21/2011).  Patient Active Problem  List    Diagnosis Date Noted     Erectile dysfunction, unspecified erectile dysfunction type 11/01/2016     Priority: Medium     Essential hypertension with goal blood pressure less than 140/90 07/13/2016     Priority: Medium     Insomnia, unspecified type 06/01/2016     Priority: Medium     Benign non-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 11/13/2015     Priority: Medium     Iron deficiency anemia 11/13/2015     Priority: Medium     ED (erectile dysfunction) 05/17/2013     Priority: Medium     Advanced directives, counseling/discussion 09/25/2012     Priority: Medium     Discussed advance care planning with patient; information given to patient to review. 9/25/2012     ZAYNAB Goodman MA           Hyperlipidemia LDL goal <130 09/21/2011     Priority: Medium     Osteoporosis 09/21/2011     Priority: Medium       Surgical Hx:  has a past surgical history that includes surgical history of -  (5/04); surgical history of -  (2013); and Herniorrhaphy ventral (N/A, 8/2/2017).    Medications:   Current Outpatient Prescriptions:      alendronate (FOSAMAX) 70 MG tablet, TAKE 1 TABLET BY MOUTH EVERY 7 DAYS, Disp: 12 tablet, Rfl: 3     alendronate (FOSAMAX) 70 MG tablet, TAKE 1 TABLET (70MG) BY MOUTH EVERY 7 DAYS, Disp: 12 tablet, Rfl: 3     aspirin 81 MG tablet, Take 1 tablet (81 mg) by mouth daily *, Disp: 100 tablet, Rfl: 1     calcium carbonate (OS-SUSHIL 500 MG [place]. [place]) 1250 MG tablet, Take 1 tablet by mouth 2 times daily, Disp: , Rfl:      Cyanocobalamin (B-12 PO), , Disp: , Rfl:      ferrous sulfate (IRON) 325 (65 FE) MG tablet, Take 1 tablet (325 mg) by mouth 2 times daily, Disp: 180 tablet, Rfl: 3     Glucosamine-Chondroit-Vit C-Mn (GLUCOSAMINE CHONDR 1500 COMPLX PO), , Disp: , Rfl:      HYDROcodone-acetaminophen (NORCO) 5-325 MG per tablet, Take 1-2 tablets by mouth every 4 hours as needed for other (Moderate to Severe Pain), Disp: 30 tablet, Rfl: 0     ibuprofen (ADVIL/MOTRIN) 600 MG tablet, Take 1  tablet (600 mg) by mouth every 8 hours as needed for pain (mild), Disp: 30 tablet, Rfl: 0     lisinopril (PRINIVIL/ZESTRIL) 5 MG tablet, Take 1 tablet (5 mg) by mouth daily, Disp: 90 tablet, Rfl: 3     LUTEIN, daily, Disp: , Rfl:      Multiple Vitamins-Minerals (CENTRUM SILVER ADULT 50+ PO), , Disp: , Rfl:      Omega-3 Fatty Acids (FISH OIL) 1000 MG CPDR, Take by mouth 2 times daily, Disp: , Rfl:      sildenafil (VIAGRA) 100 MG tablet, Take 0.5-1 tablets ( mg) by mouth daily as needed for erectile dysfunction Take 30 min to 4 hours before intercourse.  Never use with nitroglycerin, terazosin or doxazosin., Disp: 20 tablet, Rfl: 5     simvastatin (ZOCOR) 80 MG tablet, Take 1 tablet (80 mg) by mouth At Bedtime, Disp: 90 tablet, Rfl: 3     terazosin (HYTRIN) 5 MG capsule, Take 1 capsule (5 mg) by mouth At Bedtime, Disp: 90 capsule, Rfl: 3     VITAMIN D, CHOLECALCIFEROL, PO, Take by mouth daily, Disp: , Rfl:      zolpidem (AMBIEN) 10 MG tablet, Take 1 tablet (10 mg) by mouth nightly as needed for sleep, Disp: 90 tablet, Rfl: 3    Allergies: No Known Allergies    Social Hx: retired   reports that he has never smoked. He has never used smokeless tobacco. He reports that he does not drink alcohol or use illicit drugs.    Family Hx: family history includes Cancer in his mother; Hypertension in his mother.    REVIEW OF SYSTEMS: 10 point ROS neg other than the symptoms noted above in the HPI and PMH. Notables include  CONSTITUTIONAL:NEGATIVE for fever, chills, change in weight  INTEGUMENTARY/SKIN: NEGATIVE for worrisome rashes, moles or lesions  MUSCULOSKELETAL:See HPI above  NEURO: NEGATIVE for weakness, dizziness or paresthesias    This document serves as a record of the services and decisions personally performed and made by Faisal Castro MD. It was created on his behalf by Yuli Carrillo, a trained medical scribe. The creation of this document is based the provider's statements to the medical scribe.    Aris Roldan  "Carrillo 1:30 PM 9/12/2018    PHYSICAL EXAM:  /68  Pulse 79  Ht 1.753 m (5' 9\")  Wt 74.9 kg (165 lb 3.2 oz)  SpO2 98%  BMI 24.4 kg/m2   GENERAL APPEARANCE: elderly, alert, no distress  SKIN: no suspicious lesions or rashes  NEURO: Normal strength and tone, mentation intact and speech normal  PSYCH:  mentation appears normal and affect normal, not anxious  RESPIRATORY: No increased work of breathing.  HANDS: no clubbing, nail pitting.    BILATERAL LOWER EXTREMITIES:  Gait: normal  Alignment: varus  No gross deformities or masses.  Bilateral Quad atrophy, strength normal.  Intact sensation deep peroneal nerve, superficial peroneal nerve, med/lat tibial nerve, sural nerve, saphenous nerve  Intact EHL, EDL, TA, FHL, GS, quadriceps hamstrings and hip flexors  Toes warm and well perfused, brisk capillary refill. Palpable 2+ dp pulses.  Bilateral calf soft and nttp or squeeze.  No palpable popliteal lymphadenopathy.  DTRs: achilles 2+, patella 2+.  Edema: trace  Hips with full, pain-free motion. No irritability with flexion, adduction, and internal rotation.    LEFT KNEE EXAM:    Skin: intact, no ecchymosis or erythema, varicose veins  Squat: 100 %, not limited by pain.     ROM: 0 extension to 135 flexion  Tight hamstrings on straight leg raise.  Effusion: trace  Tender: NTTP med/lat joint line, anterior or posterior knee  McMurrays: negative    MCL: stable, and non-painful at both 0 and 30 degrees knee flexion  Varus stress: stable, and non-painful at both 0 and 30 degrees knee flexion  Lachmans: neg, firm endpoint  Posterior Drawer stable  Patellofemoral joint:                Apprehension: negative              Crepitations: mild   Grind: negative     RIGHT KNEE EXAM:    Skin: intact, no ecchymosis or erythema, varicose veins  Squat: 100 %, some anterior pressure    ROM: 1 extension to 135 flexion  Tight hamstrings on straight leg raise.  Effusion: trace  Tender: NTTP med/lat joint line, anterior or posterior " "knee  McMurrays: negative    MCL: stable, and non-painful at both 0 and 30 degrees knee flexion  Varus stress: stable, and non-painful at both 0 and 30 degrees knee flexion  Lachmans: neg, firm endpoint  Posterior Drawer stable  Patellofemoral joint:                Apprehension: negative              Crepitations: mild   Grind: mild    X-RAY:  3 views right knee from 9/12/2018 were reviewed in clinic today. On my review, no obvious fractures or dislocations. There is moderate right knee medial joint space narrowing, near bone on bone based on lateral view. Small marginal osteophytes. Mild patello-femoral degenerative changes with marginal osteophytes medially. Slight progression since xrays 2013        Impression:   84 year old male with chronic right knee pain, right knee primary osteoarthritis     Plan:   * reviewed imaging studies with patient, showing arthritis. Arthritis is wearing of the cartilage due to longstanding \"wear and tear\" or can follow an injury to the joint.    Discussed typical symptoms of arthritic pain is pain aggravated with weight bearing activities, stiffness, relieved by sitting or rest. Swelling may be associated. It is common to have some grinding and popping in the knee with arthritis.    Discussed various treatments for degenerative arthrosis of the knee, including nonoperative and operative approaches. Nonoperative approaches, which are exhausted prior to operative treatment, include doing nothing and living with the pain as patient has been doing, activity modification (avoid aggravating activities), physical therapy and strengthening exercises, weight loss, anti-inflammatory medications, bracing, ambulatory aids (cane, walker) and injections. Once these have been tried and are deemed unsuccessful with a good effort, and the patient is an appropriate candidate, the next treatment would be knee arthroplasty or replacement. Depending on the location of the arthritis, knee replacement can " "be partial (one side of the knee affected by arthritis) or a total knee arthroplasty (all 3 compartments). The risks, perceived benefits and perioperative rehabilitation expectations of knee arthroplasty were discussed in detail. Also discussed that approximately 10% of patients that undergo knee arthroplasty are not happy following surgery and may have worse pain or no improvement in pain, contrary to their preoperative expectations.    At this time, nonoperative treatment will be pursued.  Non-surgical treatment for knee arthritis includes:    * rest, sitting  * Activity modification - avoid impact activities or activities that aggravate symptoms.  * NSAIDS (non-steroidal anti-inflammatory medications; e.g. Aleve, advil, motrin, ibuprofen) - regular use for inflammation ( twice daily or three times daily), with food, as long as no contra-indications Please discuss with primary care doctor if needed  * ice, 15-20 minutes at a time several times a day or as needed.  * Strengthening of quadriceps muscles  * Physical Therapy for strengthening, stretching and range of motion exercises of legs  * Tylenol as needed for pain, consider Tylenol arthritis or similar  * Weight loss: Weight loss:  Body mass index is 24.4 kg/(m^2).. weight loss benefits, not only for the current pain symptoms, but also overall health. Recommend a good diet plan that works for the patient, with the assistance of a dietician or primary care doctor as needed. Also, a good, low-impact exercise program for at least 20 minutes per day, 3 times per week, such as exercise bike, elliptical , or pool.  * Exercise: low impact such as stationary bike, elliptical, pool.  * Injections: cortisone versus viscosupplementation (hyaluronic acid, \"rooster comb\", \"gel shots\"); risks and perceived benefits discussed today. Patient elects to proceed.  * Bracing: bracing the knee may offer some relief of symptoms when worn and provide some stability.  * over " the counter supplements such as glucosamine and chondroitin sulfate may help with joint pain.  * topical ointments may help as well    Patient to follow up with Primary Care provider regarding elevated blood pressure.    * return to clinic as needed.    PROCEDURE NOTE:  The risks, perceived benefits and potential complications (including but not limited to: bleeding, infection, pain, scar, damage to adjacent structures, atrophy or necrosis of soft tissue, skin blanching, failure to relieve symptoms, worsening of symptoms, allergic reaction) of injection were discussed with the patient. Questions were addressed and answered.The patient elected to proceed. Written informed consent was obtained. The correct procedural site was identified and confirmed. A RIGHT knee intraarticular injection was performed using 1mL Depo Medrol 80mg per mL and 7mL (4mL 1% lidocaine, 3mL 0.25% marcaine)  of local anesthetic after sterile prep, to the correct procedural site. Sterile bandaid applied. This was tolerated well by the patient. No apparent complications. Did also discuss that if diabetic, recommend close monitoring of blood sugars over the next week as cortisone injections can temporarily elevate blood sugars.        The information in this document, created by a scribe for me, accurately reflects the services I personally performed and the decisions made by me. I have reviewed and approved this document for accuracy.     Faisal Castro M.D., M.S.  Dept. of Orthopaedic Surgery  [facility]    Large Joint Injection/Arthocentesis  Date/Time: 9/12/2018 1:56 PM  Performed by: JUAN MINOR  Authorized by: FAISAL CASTRO DAMON     Indications:  Pain  Needle Size:  22 G  Guidance: landmark guided    Approach:  Anteromedial  Location:  Knee  Site:  R knee joint  Medications:  4 mL lidocaine 1 %; 80 mg methylPREDNISolone acetate 80 MG/ML; 3 mL bupivacaine 0.25 %  Consent Given by:  Patient  Prep: patient was prepped and  draped in usual sterile fashion            Again, thank you for allowing me to participate in the care of your patient.        Sincerely,        Faisal Castro MD

## 2018-09-19 ENCOUNTER — OFFICE VISIT (OUTPATIENT)
Dept: FAMILY MEDICINE | Facility: CLINIC | Age: 83
End: 2018-09-19
Payer: MEDICARE

## 2018-09-19 VITALS
HEART RATE: 66 BPM | TEMPERATURE: 97.7 F | OXYGEN SATURATION: 96 % | SYSTOLIC BLOOD PRESSURE: 121 MMHG | WEIGHT: 161 LBS | BODY MASS INDEX: 23.85 KG/M2 | DIASTOLIC BLOOD PRESSURE: 67 MMHG | HEIGHT: 69 IN | RESPIRATION RATE: 16 BRPM

## 2018-09-19 DIAGNOSIS — G47.00 INSOMNIA, UNSPECIFIED TYPE: ICD-10-CM

## 2018-09-19 DIAGNOSIS — D50.9 IRON DEFICIENCY ANEMIA, UNSPECIFIED IRON DEFICIENCY ANEMIA TYPE: ICD-10-CM

## 2018-09-19 DIAGNOSIS — Z00.00 ROUTINE HISTORY AND PHYSICAL EXAMINATION OF ADULT: Primary | ICD-10-CM

## 2018-09-19 DIAGNOSIS — I10 ESSENTIAL HYPERTENSION WITH GOAL BLOOD PRESSURE LESS THAN 140/90: ICD-10-CM

## 2018-09-19 DIAGNOSIS — E78.5 HYPERLIPIDEMIA LDL GOAL <130: ICD-10-CM

## 2018-09-19 DIAGNOSIS — N40.1 BENIGN NON-NODULAR PROSTATIC HYPERPLASIA WITH LOWER URINARY TRACT SYMPTOMS: ICD-10-CM

## 2018-09-19 LAB
ALBUMIN SERPL-MCNC: 3.1 G/DL (ref 3.4–5)
ALP SERPL-CCNC: 96 U/L (ref 40–150)
ALT SERPL W P-5'-P-CCNC: 15 U/L (ref 0–70)
ANION GAP SERPL CALCULATED.3IONS-SCNC: 5 MMOL/L (ref 3–14)
AST SERPL W P-5'-P-CCNC: 13 U/L (ref 0–45)
BILIRUB SERPL-MCNC: 0.4 MG/DL (ref 0.2–1.3)
BUN SERPL-MCNC: 17 MG/DL (ref 7–30)
CALCIUM SERPL-MCNC: 8.4 MG/DL (ref 8.5–10.1)
CHLORIDE SERPL-SCNC: 107 MMOL/L (ref 94–109)
CHOLEST SERPL-MCNC: 77 MG/DL
CO2 SERPL-SCNC: 30 MMOL/L (ref 20–32)
CREAT SERPL-MCNC: 0.85 MG/DL (ref 0.66–1.25)
CREAT UR-MCNC: 114 MG/DL
DIFFERENTIAL METHOD BLD: ABNORMAL
ERYTHROCYTE [DISTWIDTH] IN BLOOD BY AUTOMATED COUNT: 15.3 % (ref 10–15)
GFR SERPL CREATININE-BSD FRML MDRD: 86 ML/MIN/1.7M2
GLUCOSE SERPL-MCNC: 96 MG/DL (ref 70–99)
HCT VFR BLD AUTO: 36.8 % (ref 40–53)
HDLC SERPL-MCNC: 46 MG/DL
HGB BLD-MCNC: 11.9 G/DL (ref 13.3–17.7)
LDLC SERPL CALC-MCNC: 23 MG/DL
MCH RBC QN AUTO: 30.5 PG (ref 26.5–33)
MCHC RBC AUTO-ENTMCNC: 32.3 G/DL (ref 31.5–36.5)
MCV RBC AUTO: 94 FL (ref 78–100)
MICROALBUMIN UR-MCNC: 8 MG/L
MICROALBUMIN/CREAT UR: 7.32 MG/G CR (ref 0–17)
NONHDLC SERPL-MCNC: 31 MG/DL
PLATELET # BLD AUTO: 174 10E9/L (ref 150–450)
POTASSIUM SERPL-SCNC: 4.2 MMOL/L (ref 3.4–5.3)
PROT SERPL-MCNC: 7.9 G/DL (ref 6.8–8.8)
RBC # BLD AUTO: 3.9 10E12/L (ref 4.4–5.9)
SODIUM SERPL-SCNC: 142 MMOL/L (ref 133–144)
TRIGL SERPL-MCNC: 41 MG/DL
WBC # BLD AUTO: 6.2 10E9/L (ref 4–11)

## 2018-09-19 PROCEDURE — 85025 COMPLETE CBC W/AUTO DIFF WBC: CPT | Performed by: FAMILY MEDICINE

## 2018-09-19 PROCEDURE — 80061 LIPID PANEL: CPT | Performed by: FAMILY MEDICINE

## 2018-09-19 PROCEDURE — 99214 OFFICE O/P EST MOD 30 MIN: CPT | Performed by: FAMILY MEDICINE

## 2018-09-19 PROCEDURE — 80053 COMPREHEN METABOLIC PANEL: CPT | Performed by: FAMILY MEDICINE

## 2018-09-19 PROCEDURE — 36415 COLL VENOUS BLD VENIPUNCTURE: CPT | Performed by: FAMILY MEDICINE

## 2018-09-19 PROCEDURE — 82043 UR ALBUMIN QUANTITATIVE: CPT | Performed by: FAMILY MEDICINE

## 2018-09-19 RX ORDER — FERROUS SULFATE 325(65) MG
325 TABLET ORAL 2 TIMES DAILY
Qty: 180 TABLET | Refills: 3 | Status: SHIPPED | OUTPATIENT
Start: 2018-09-19 | End: 2019-07-31

## 2018-09-19 RX ORDER — ZOLPIDEM TARTRATE 10 MG/1
10 TABLET ORAL
Qty: 90 TABLET | Refills: 3 | Status: SHIPPED | OUTPATIENT
Start: 2018-09-19 | End: 2019-06-18

## 2018-09-19 RX ORDER — TERAZOSIN 5 MG/1
5 CAPSULE ORAL AT BEDTIME
Qty: 90 CAPSULE | Refills: 3 | Status: SHIPPED | OUTPATIENT
Start: 2018-09-19 | End: 2019-07-06

## 2018-09-19 RX ORDER — LISINOPRIL 5 MG/1
5 TABLET ORAL DAILY
Qty: 90 TABLET | Refills: 3 | Status: SHIPPED | OUTPATIENT
Start: 2018-09-19 | End: 2019-07-31

## 2018-09-19 RX ORDER — SIMVASTATIN 80 MG
80 TABLET ORAL AT BEDTIME
Qty: 90 TABLET | Refills: 3 | Status: SHIPPED | OUTPATIENT
Start: 2018-09-19 | End: 2019-07-31

## 2018-09-19 ASSESSMENT — PAIN SCALES - GENERAL: PAINLEVEL: NO PAIN (0)

## 2018-09-19 NOTE — LETTER
September 19, 2018      Butch Carter  [address]  [facility] [place] [address]        Dear Butch Carter    Your blood counts are stable. Your kidney, liver, electrolyte, blood sugar, cholesterol and urine tests were normal.      Enclosed is a copy of the results.  It was a pleasure to see you at your last appointment.    If you have any questions or concerns, please call myself or my nurse at [phone].      Sincerely,      Tate Hsu MD/CHANEL Pulido MA      Results for orders placed or performed in visit on 09/19/18   Comprehensive metabolic panel (BMP + Alb, Alk Phos, ALT, AST, Total. Bili, TP)   Result Value Ref Range    Sodium 142 133 - 144 mmol/L    Potassium 4.2 3.4 - 5.3 mmol/L    Chloride 107 94 - 109 mmol/L    Carbon Dioxide 30 20 - 32 mmol/L    Anion Gap 5 3 - 14 mmol/L    Glucose 96 70 - 99 mg/dL    Urea Nitrogen 17 7 - 30 mg/dL    Creatinine 0.85 0.66 - 1.25 mg/dL    GFR Estimate 86 >60 mL/min/1.7m2    GFR Estimate If Black >90 >60 mL/min/1.7m2    Calcium 8.4 (L) 8.5 - 10.1 mg/dL    Bilirubin Total 0.4 0.2 - 1.3 mg/dL    Albumin 3.1 (L) 3.4 - 5.0 g/dL    Protein Total 7.9 6.8 - 8.8 g/dL    Alkaline Phosphatase 96 40 - 150 U/L    ALT 15 0 - 70 U/L    AST 13 0 - 45 U/L   Lipid panel reflex to direct LDL Fasting   Result Value Ref Range    Cholesterol 77 <200 mg/dL    Triglycerides 41 <150 mg/dL    HDL Cholesterol 46 >39 mg/dL    LDL Cholesterol Calculated 23 <100 mg/dL    Non HDL Cholesterol 31 <130 mg/dL   Albumin Random Urine Quantitative with Creat Ratio   Result Value Ref Range    Creatinine Urine 114 mg/dL    Albumin Urine mg/L 8 mg/L    Albumin Urine mg/g Cr 7.32 0 - 17 mg/g Cr   CBC with platelets differential   Result Value Ref Range    WBC 6.2 4.0 - 11.0 10e9/L    RBC Count 3.90 (L) 4.4 - 5.9 10e12/L    Hemoglobin 11.9 (L) 13.3 - 17.7 g/dL    Hematocrit 36.8 (L) 40.0 - 53.0 %    MCV 94 78 - 100 fl    MCH 30.5 26.5 - 33.0 pg    MCHC 32.3 31.5 - 36.5 g/dL    RDW 15.3 (H) 10.0 - 15.0 %     Platelet Count 174 150 - 450 10e9/L    Diff Method Automated Method

## 2018-09-19 NOTE — MR AVS SNAPSHOT
After Visit Summary   9/19/2018    Butch Carter    MRN: [id number]           Patient Information     Date Of Birth          10/9/1933        Visit Information        Provider Department      9/19/2018 8:40 AM Tate Hsu MD [facility]        Today's Diagnoses     Routine history and physical examination of adult    -  1    Essential hypertension with goal blood pressure less than 140/90        Hyperlipidemia LDL goal <130        Iron deficiency anemia, unspecified iron deficiency anemia type        Benign non-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        Insomnia, unspecified type          Care Instructions    At [facility], we strive to deliver an exceptional experience to you, every time we see you.  If you receive a survey in the mail, please send us back your thoughts. We really do value your feedback.    Based on your medical history, these are the current health maintenance/preventive care services that you are due for (some may have been done at this visit.)  Health Maintenance Due   Topic Date Due     URINE DRUG SCREEN Q1 YR  [birth year]09/1948     MEDICARE ANNUAL WELLNESS VISIT  [birth year]09/1951     ADVANCE DIRECTIVE PLANNING Q5 YRS  09/25/2017     BMP Q1 YR  07/18/2018     INFLUENZA VACCINE (1) 09/01/2018     FALL RISK ASSESSMENT  10/05/2018     MICROALBUMIN Q1 YEAR  10/05/2018     PHQ-2 Q1 YR  10/05/2018         Suggested websites for health information:  Www.[employer]ago[employer]il.org : Up to date and easily searchable information on multiple topics.  Www.medlineplus.gov : medication info, interactive tutorials, watch real surgeries online  Www.familydoctor.org : good info from the Academy of Family Physicians  Www.cdc.gov : public health info, travel advisories, epidemics (H1N1)  Www.aap.org : children's health info, normal development, vaccinations  Www.health.state.[place].us : [place] dept of health, public health issues in [place], N1N1    Your care team:         "                    Family Medicine Internal Medicine   MD Rudolph Escobar MD Shantel Branch-Fleming, MD Katya Georgiev PA-C Nam Ho, MD Pediatrics   MITZI Ramos, VISH Llamas APRMD Bree Contreras CNP, MD Deborah Mielke, MD Kim Thein, APR[facility]      Clinic hours: Monday - Thursday 7 am-7 pm; Fridays 7 am-5 pm.   Urgent care: Monday - Friday 11 am-9 pm; Saturday and Sunday 9 am-5 pm.  Pharmacy : Monday -Thursday 8 am-8 pm; Friday 8 am-6 pm; Saturday and Sunday 9 am-5 pm.     Clinic: [phone]   Pharmacy: [phone]            Follow-ups after your visit        Who to contact     If you have questions or need follow up information about today's clinic visit or your schedule please contact [facility] directly at [phone].  Normal or non-critical lab and imaging results will be communicated to you by [employer], letter or phone within 4 business days after the clinic has received the results. If you do not hear from us within 7 days, please contact the clinic through [employer] or phone. If you have a critical or abnormal lab result, we will notify you by phone as soon as possible.  Submit refill requests through [employer] or call your pharmacy and they will forward the refill request to us. Please allow 3 business days for your refill to be completed.          Additional Information About Your Visit        [employer] Information     [employer] lets you send messages to your doctor, view your test results, renew your prescriptions, schedule appointments and more. To sign up, go to [email or web address] . Click on \"Log in\" on the left side of the screen, which will take you to the Welcome page. Then click on \"Sign up Now\" on the right side of the page.     You will be asked to enter the access code listed below, as well as some personal information. Please follow the directions to create your username and password.     Your " "access code is: [email or web address]  Expires: 2018  2:31 PM     Your access code will  in 90 days. If you need help or a new code, please call your [facility] or [phone].        Care EveryWhere ID     This is your Care EveryWhere ID. This could be used by other organizations to access your [facility] medical records  [id number]        Your Vitals Were     Pulse Temperature Respirations Height Pulse Oximetry BMI (Body Mass Index)    66 97.7  F (36.5  C) (Oral) 16 5' 9\" (1.753 m) 96% 23.78 kg/m2       Blood Pressure from Last 3 Encounters:   18 121/67   18 129/68   17 136/68    Weight from Last 3 Encounters:   18 161 lb (73 kg)   18 165 lb 3.2 oz (74.9 kg)   17 165 lb (74.8 kg)              We Performed the Following     Albumin Random Urine Quantitative with Creat Ratio     CBC with platelets differential     Comprehensive metabolic panel (BMP + Alb, Alk Phos, ALT, AST, Total. Bili, TP)     Lipid panel reflex to direct LDL Fasting          Where to get your medicines      These medications were sent to [facility] [id number]  [address] [place] [address]     Phone:  [phone]     ferrous sulfate 325 (65 Fe) MG tablet    lisinopril 5 MG tablet    simvastatin 80 MG tablet    terazosin 5 MG capsule         Some of these will need a paper prescription and others can be bought over the counter.  Ask your nurse if you have questions.     Bring a paper prescription for each of these medications     zolpidem 10 MG tablet          Primary Care Provider Office Phone # Fax #    Tate Hsu [phone]       [address]  [facility] [address]        Equal Access to Services     KARIN GIBBS : Maru ureña Sojanice, wapatricioda luqadaha, qaybta kaalamanda toledo. [facility] [phone].    ATENCIÓN: Si chago huber " disposición servicios gratuitos de asistencia lingüística. Daphne gurrola [phone].    We comply with applicable federal civil rights laws and [place] laws. We do not discriminate on the basis of race, color, national origin, age, disability, sex, sexual orientation, or gender identity.            Thank you!     Thank you for choosing [facility]  for your care. Our goal is always to provide you with excellent care. Hearing back from our patients is one way we can continue to improve our services. Please take a few minutes to complete the written survey that you may receive in the mail after your visit with us. Thank you!             Your Updated Medication List - Protect others around you: Learn how to safely use, store and throw away your medicines at www.disposemymeds.org.          This list is accurate as of 9/19/18  8:47 AM.  Always use your most recent med list.                   Brand Name Dispense Instructions for use Diagnosis    alendronate 70 MG tablet    FOSAMAX    12 tablet    TAKE 1 TABLET BY MOUTH EVERY 7 DAYS    Age related osteoporosis, unspecified pathological fracture presence       aspirin 81 MG tablet     100 tablet    Take 1 tablet (81 mg) by mouth daily *    Hypertension goal BP (blood pressure) < 140/90       B-12 PO           calcium carbonate 500 mg {elemental} 500 MG tablet    OS-SUSHIL     Take 1 tablet by mouth 2 times daily        CENTRUM SILVER ADULT 50+ PO           ferrous sulfate 325 (65 Fe) MG tablet    IRON    180 tablet    Take 1 tablet (325 mg) by mouth 2 times daily    Iron deficiency anemia, unspecified iron deficiency anemia type       Fish Oil 1000 MG Cpdr      Take by mouth 2 times daily        GLUCOSAMINE CHONDR 1500 COMPLX PO           HYDROcodone-acetaminophen 5-325 MG per tablet    NORCO    30 tablet    Take 1-2 tablets by mouth every 4 hours as needed for other (Moderate to Severe Pain)    Acute post-operative pain       ibuprofen 600 MG tablet     ADVIL/MOTRIN    30 tablet    Take 1 tablet (600 mg) by mouth every 8 hours as needed for pain (mild)    Acute post-operative pain       lisinopril 5 MG tablet    PRINIVIL/ZESTRIL    90 tablet    Take 1 tablet (5 mg) by mouth daily    Essential hypertension with goal blood pressure less than 140/90       LUTEIN      daily        sildenafil 100 MG tablet    VIAGRA    20 tablet    Take 0.5-1 tablets ( mg) by mouth daily as needed for erectile dysfunction Take 30 min to 4 hours before intercourse.  Never use with nitroglycerin, terazosin or doxazosin.    Erectile dysfunction, unspecified erectile dysfunction type       simvastatin 80 MG tablet    ZOCOR    90 tablet    Take 1 tablet (80 mg) by mouth At Bedtime    Hyperlipidemia LDL goal <130       terazosin 5 MG capsule    HYTRIN    90 capsule    Take 1 capsule (5 mg) by mouth At Bedtime    Benign non-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       VITAMIN D (CHOLECALCIFEROL) PO      Take by mouth daily        zolpidem 10 MG tablet    AMBIEN    90 tablet    Take 1 tablet (10 mg) by mouth nightly as needed for sleep    Insomnia, unspecified type

## 2018-09-19 NOTE — PATIENT INSTRUCTIONS
At [facility], we strive to deliver an exceptional experience to you, every time we see you.  If you receive a survey in the mail, please send us back your thoughts. We really do value your feedback.    Based on your medical history, these are the current health maintenance/preventive care services that you are due for (some may have been done at this visit.)  Health Maintenance Due   Topic Date Due     URINE DRUG SCREEN Q1 YR  10[birth year]09/1948     MEDICARE ANNUAL WELLNESS VISIT  [birth year]/09/1951     ADVANCE DIRECTIVE PLANNING Q5 YRS  09/25/2017     BMP Q1 YR  07/18/2018     INFLUENZA VACCINE (1) 09/01/2018     FALL RISK ASSESSMENT  10/05/2018     MICROALBUMIN Q1 YEAR  10/05/2018     PHQ-2 Q1 YR  10/05/2018         Suggested websites for health information:  Www.[employer] : Up to date and easily searchable information on multiple topics.  Www.B[employer]ie Therapies.gov : medication info, interactive tutorials, watch real surgeries online  Www.familydoctor.org : good info from the Academy of Family Physicians  Www.cdc.gov : public health info, travel advisories, epidemics (H1N1)  Www.aap.org : children's health info, normal development, vaccinations  Www.health.[facility].[place].us : [place] dept of health, public health issues in [place], N1N1    Your care team:                            Family Medicine Internal Medicine   MD Rudolph Escobar MD Shantel Branch-Fleming, MD Katya Georgiev PA-C Nam Ho, MD Pediatrics   MITZI Ramos, MD Bree Salmon CNP, MD Deborah Mielke, MD Kim Thein, APRN CNP      Clinic hours: Monday - Thursday 7 am-7 pm; Fridays 7 am-5 pm.   Urgent care: Monday - Friday 11 am-9 pm; Saturday and Sunday 9 am-5 pm.  Pharmacy : Monday -Thursday 8 am-8 pm; Friday 8 am-6 pm; Saturday and Sunday 9 am-5 pm.     Clinic: [phone]   Pharmacy: [phone]

## 2018-09-19 NOTE — PROGRESS NOTES
SUBJECTIVE:   Butch Carter is a 84 year old male who presents to clinic today for the following health issues:    Routine physical.    Hyperlipidemia Follow-Up      Rate your low fat/cholesterol diet?: good    Taking statin?  Yes, no muscle aches from statin    Other lipid medications/supplements?:  none    Hypertension Follow-up      Outpatient blood pressures are being checked at home.  Results are 120/70    Low Salt Diet: low salt      Amount of exercise or physical activity: None, some days go for walk, and yard work    Problems taking medications regularly: No    Medication side effects: none    Diet: low salt and low fat/cholesterol      Problem list and histories reviewed & adjusted, as indicated.  Additional history: as documented    Patient Active Problem List   Diagnosis     Hyperlipidemia LDL goal <130     Osteoporosis     Advanced directives, counseling/discussion     ED (erectile dysfunction)     Benign non-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     Iron deficiency anemia     Insomnia, unspecified type     Essential hypertension with goal blood pressure less than 140/90     Erectile dysfunction, unspecified erectile dysfunction type     Past Surgical History:   Procedure Laterality Date     HERNIORRHAPHY VENTRAL N/A 8/2/2017    Procedure: HERNIORRHAPHY VENTRAL;  VENTRAL HERNIA REPAIR WITH MESH;  Surgeon: Hari Durham MD;  Location: [facility]     SURGICAL HISTORY OF -   5/04    Double Hernia     SURGICAL HISTORY OF -   2013    cataract OD       Social History   Substance Use Topics     Smoking status: Never Smoker     Smokeless tobacco: Never Used     Alcohol use No     Family History   Problem Relation Age of Onset     Cancer Mother      Hypertension Mother            Reviewed and updated as needed this visit by clinical staff  Allergies  Meds       Reviewed and updated as needed this visit by Provider         ROS:  Constitutional, HEENT, cardiovascular, pulmonary, GI, ,  "musculoskeletal, neuro, skin, endocrine and psych systems are negative, except as otherwise noted.    OBJECTIVE:     /67 (BP Location: Left arm, Patient Position: Chair, Cuff Size: Adult Large)  Pulse 66  Temp 97.7  F (36.5  C) (Oral)  Resp 16  Ht 5' 9\" (1.753 m)  Wt 161 lb (73 kg)  SpO2 96%  BMI 23.78 kg/m2  Body mass index is 23.78 kg/(m^2).  GENERAL: healthy, alert and no distress  NECK: no adenopathy, no asymmetry, masses, or scars and thyroid normal to palpation  RESP: lungs clear to auscultation - no rales, rhonchi or wheezes  CV: regular rate and rhythm, normal S1 S2, no S3 or S4, no murmur, click or rub, no peripheral edema and peripheral pulses strong  ABDOMEN: soft, nontender, no hepatosplenomegaly, no masses and bowel sounds normal  MS: no gross musculoskeletal defects noted, no edema    Diagnostic Test Results:  none     ASSESSMENT/PLAN:     1. Routine history and physical examination of adult  As below.    2. Essential hypertension with goal blood pressure less than 140/90  Controlled. RTC in 6 months.  - lisinopril (PRINIVIL/ZESTRIL) 5 MG tablet; Take 1 tablet (5 mg) by mouth daily  Dispense: 90 tablet; Refill: 3  - Comprehensive metabolic panel (BMP + Alb, Alk Phos, ALT, AST, Total. Bili, TP)  - Lipid panel reflex to direct LDL Fasting  - Albumin Random Urine Quantitative with Creat Ratio    3. Hyperlipidemia LDL goal <130  Controlled.  - simvastatin (ZOCOR) 80 MG tablet; Take 1 tablet (80 mg) by mouth At Bedtime  Dispense: 90 tablet; Refill: 3  - Lipid panel reflex to direct LDL Fasting    4. Iron deficiency anemia, unspecified iron deficiency anemia type  Recheck.  - ferrous sulfate (IRON) 325 (65 Fe) MG tablet; Take 1 tablet (325 mg) by mouth 2 times daily  Dispense: 180 tablet; Refill: 3  - CBC with platelets differential    5. Benign non-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  Stable.  - terazosin (HYTRIN) 5 MG capsule; Take 1 capsule (5 mg) by mouth At " Bedtime  Dispense: 90 capsule; Refill: 3    6. Insomnia, unspecified type  Stable.  - zolpidem (AMBIEN) 10 MG tablet; Take 1 tablet (10 mg) by mouth nightly as needed for sleep  Dispense: 90 tablet; Refill: 3    Regular exercise  See Patient Instructions    Tate Hsu MD, MD  [facility]

## 2019-03-18 DIAGNOSIS — M81.0 AGE RELATED OSTEOPOROSIS, UNSPECIFIED PATHOLOGICAL FRACTURE PRESENCE: ICD-10-CM

## 2019-03-19 NOTE — TELEPHONE ENCOUNTER
"Requested Prescriptions   Pending Prescriptions Disp Refills     alendronate (FOSAMAX) 70 MG tablet [Pharmacy Med Name: ALENDRONATE 70MG    TAB] 12 tablet 3    Last Written Prescription Date:  2/15/18  Last Fill Quantity: 12,  # refills: 3   Last Office Visit with G, P or [facility] prescribing provider:  9/19/18   Future Office Visit:      Sig: TAKE 1 TABLET BY MOUTH EVERY 7 DAYS    Bisphosphonates Failed - 3/18/2019  5:09 PM       Failed - Dexa on file within past 2 years    Please review last Dexa result.          Passed - Recent (12 mo) or future (30 days) visit within the authorizing provider's specialty    Patient had office visit in the last 12 months or has a visit in the next 30 days with authorizing provider or within the authorizing provider's specialty.  See \"Patient Info\" tab in inbasket, or \"Choose Columns\" in Meds & Orders section of the refill encounter.             Passed - Medication is active on med list       Passed - Patient is age 18 or older       Passed - Normal serum creatinine on file within past 12 months    Recent Labs   Lab Test 09/19/18  0913   CR 0.85               "

## 2019-03-20 RX ORDER — ALENDRONATE SODIUM 70 MG/1
TABLET ORAL
Qty: 12 TABLET | Refills: 3 | Status: SHIPPED | OUTPATIENT
Start: 2019-03-20 | End: 2019-07-31

## 2019-03-20 NOTE — TELEPHONE ENCOUNTER
Routing refill request to provider for review/approval because:  Failed DEXA.    Shakira Yap RN, [facility]

## 2019-05-15 ENCOUNTER — TELEPHONE (OUTPATIENT)
Dept: FAMILY MEDICINE | Facility: CLINIC | Age: 84
End: 2019-05-15

## 2019-05-15 DIAGNOSIS — G47.00 INSOMNIA, UNSPECIFIED TYPE: ICD-10-CM

## 2019-05-15 RX ORDER — ZOLPIDEM TARTRATE 10 MG/1
TABLET ORAL
Qty: 90 TABLET | Refills: 2 | OUTPATIENT
Start: 2019-05-15

## 2019-05-15 NOTE — TELEPHONE ENCOUNTER
This writer attempted to contact patient on 05/15/19      Reason for call transfer medication and left detailed message.      If patient calls back:   1st floor [facility] Care Team (MA/TC) called. Inform patient that someone from the team will contact them, document that pt called and route to care team.         Nettie Coleman MA

## 2019-05-15 NOTE — TELEPHONE ENCOUNTER
Requested Prescriptions   Pending Prescriptions Disp Refills     zolpidem (AMBIEN) 10 MG tablet [Pharmacy Med Name: Zolpidem Tartrate Oral Tablet 10 MG]            Last Written Prescription Date:  9/19/18  Last Fill Quantity: 90,   # refills: 3  Last Office Visit: 9/19/18  Future Office visit:       Routing refill request to provider for review/approval because:  Drug not on the G, [facility] or [facility] refill protocol or controlled substance   90 tablet 2     Sig: TAKE ONE TABLET BY MOUTH NIGHTLY AS NEEDED FOR SLEEP       There is no refill protocol information for this order              Henrique Faarax  [facility]

## 2019-05-15 NOTE — TELEPHONE ENCOUNTER
Different pharmacy being requested for refill than previously sent to.     Team please call patient and notify them that they can transfer their prescription from [facility] pharmacy to [facility] pharmacy if they contact the [facility] pharmacy.      Gillian Mark RN, BSN

## 2019-05-16 NOTE — TELEPHONE ENCOUNTER
Called [facility] pharmacy and they will follow up with patient regarding transfer of medications.    Delroy Alaniz CMA

## 2019-06-18 DIAGNOSIS — G47.00 INSOMNIA, UNSPECIFIED TYPE: ICD-10-CM

## 2019-06-18 RX ORDER — ZOLPIDEM TARTRATE 10 MG/1
10 TABLET ORAL
Qty: 90 TABLET | Refills: 3 | Status: SHIPPED | OUTPATIENT
Start: 2019-06-18 | End: 2020-01-05

## 2019-06-24 DIAGNOSIS — M81.0 AGE RELATED OSTEOPOROSIS, UNSPECIFIED PATHOLOGICAL FRACTURE PRESENCE: ICD-10-CM

## 2019-06-25 NOTE — TELEPHONE ENCOUNTER
"Requested Prescriptions   Pending Prescriptions Disp Refills     alendronate (FOSAMAX) 70 MG tablet [Pharmacy Med Name: ALENDRONATE 70MG    TAB]  Last Written Prescription Date:  3/20/19  Last Fill Quantity: 12,  # refills: 3   Last Office Visit with [facility], P or [facility] prescribing provider:  9/19/18   Future Office Visit:      12 tablet 3     Sig: TAKE 1 TABLET BY MOUTH EVERY 7 DAYS       Bisphosphonates Failed - 6/24/2019  2:50 PM        Failed - Dexa on file within past 2 years     Please review last Dexa result.           Passed - Recent (12 mo) or future (30 days) visit within the authorizing provider's specialty     Patient had office visit in the last 12 months or has a visit in the next 30 days with authorizing provider or within the authorizing provider's specialty.  See \"Patient Info\" tab in inbasket, or \"Choose Columns\" in Meds & Orders section of the refill encounter.              Passed - Medication is active on med list        Passed - Patient is age 18 or older        Passed - Normal serum creatinine on file within past 12 months     Recent Labs   Lab Test 09/19/18  0913   CR 0.85               "

## 2019-06-26 RX ORDER — ALENDRONATE SODIUM 70 MG/1
TABLET ORAL
Qty: 12 TABLET | Refills: 3 | OUTPATIENT
Start: 2019-06-26

## 2019-07-06 DIAGNOSIS — N40.1 BENIGN NON-NODULAR PROSTATIC HYPERPLASIA WITH LOWER URINARY TRACT SYMPTOMS: ICD-10-CM

## 2019-07-06 NOTE — TELEPHONE ENCOUNTER
"Requested Prescriptions   Pending Prescriptions Disp Refills     terazosin (HYTRIN) 5 MG capsule [Pharmacy Med Name: TERAZOSIN 5MG       CAP] 90 capsule 3     Sig: TAKE 1 CAPSULE BY MOUTH AT BEDTIME         Last Written Prescription Date:  9/19/18  Last Fill Quantity: 90,  # refills: 3   Last Office Visit with [facility], [facility] or [facility] prescribing provider:  9/19/18   Future Office Visit:         Alpha Blockers Failed - 7/6/2019  4:45 PM        Failed - Patient does not have Tadalafil, Vardenafil, or Sildenafil on their medication list        Passed - Blood pressure under 140/90 in past 12 months     BP Readings from Last 3 Encounters:   09/19/18 121/67   09/12/18 129/68   11/03/17 136/68                 Passed - Recent (12 mo) or future (30 days) visit within the authorizing provider's specialty     Patient had office visit in the last 12 months or has a visit in the next 30 days with authorizing provider or within the authorizing provider's specialty.  See \"Patient Info\" tab in inbasket, or \"Choose Columns\" in Meds & Orders section of the refill encounter.              Passed - Medication is active on med list        Passed - Patient is 18 years of age or older              Henrique Faarax  [facility]  "

## 2019-07-10 RX ORDER — TERAZOSIN 5 MG/1
CAPSULE ORAL
Qty: 90 CAPSULE | Refills: 3 | Status: SHIPPED | OUTPATIENT
Start: 2019-07-10 | End: 2020-09-02

## 2019-07-10 NOTE — TELEPHONE ENCOUNTER
Routing refill request to provider for review/approval because:  Patient is on sildenafil so RN protocol fails.    Shakira Yap RN, [facility]

## 2019-07-31 ENCOUNTER — ANCILLARY PROCEDURE (OUTPATIENT)
Dept: GENERAL RADIOLOGY | Facility: CLINIC | Age: 84
End: 2019-07-31
Attending: FAMILY MEDICINE
Payer: MEDICARE

## 2019-07-31 ENCOUNTER — OFFICE VISIT (OUTPATIENT)
Dept: FAMILY MEDICINE | Facility: CLINIC | Age: 84
End: 2019-07-31
Payer: MEDICARE

## 2019-07-31 VITALS
SYSTOLIC BLOOD PRESSURE: 123 MMHG | WEIGHT: 160 LBS | OXYGEN SATURATION: 94 % | HEIGHT: 69 IN | TEMPERATURE: 97.8 F | BODY MASS INDEX: 23.7 KG/M2 | RESPIRATION RATE: 18 BRPM | HEART RATE: 65 BPM | DIASTOLIC BLOOD PRESSURE: 66 MMHG

## 2019-07-31 DIAGNOSIS — M79.661 PAIN OF RIGHT LOWER LEG: ICD-10-CM

## 2019-07-31 DIAGNOSIS — M81.0 AGE RELATED OSTEOPOROSIS, UNSPECIFIED PATHOLOGICAL FRACTURE PRESENCE: ICD-10-CM

## 2019-07-31 DIAGNOSIS — I10 ESSENTIAL HYPERTENSION WITH GOAL BLOOD PRESSURE LESS THAN 140/90: ICD-10-CM

## 2019-07-31 DIAGNOSIS — M25.571 ACUTE RIGHT ANKLE PAIN: ICD-10-CM

## 2019-07-31 DIAGNOSIS — E78.5 HYPERLIPIDEMIA LDL GOAL <130: Primary | ICD-10-CM

## 2019-07-31 DIAGNOSIS — D50.9 IRON DEFICIENCY ANEMIA, UNSPECIFIED IRON DEFICIENCY ANEMIA TYPE: ICD-10-CM

## 2019-07-31 LAB
ALBUMIN SERPL-MCNC: 3 G/DL (ref 3.4–5)
ALP SERPL-CCNC: 97 U/L (ref 40–150)
ALT SERPL W P-5'-P-CCNC: 14 U/L (ref 0–70)
ANION GAP SERPL CALCULATED.3IONS-SCNC: 5 MMOL/L (ref 3–14)
AST SERPL W P-5'-P-CCNC: 14 U/L (ref 0–45)
BILIRUB SERPL-MCNC: 0.4 MG/DL (ref 0.2–1.3)
BUN SERPL-MCNC: 14 MG/DL (ref 7–30)
CALCIUM SERPL-MCNC: 8.9 MG/DL (ref 8.5–10.1)
CHLORIDE SERPL-SCNC: 106 MMOL/L (ref 94–109)
CHOLEST SERPL-MCNC: 82 MG/DL
CO2 SERPL-SCNC: 28 MMOL/L (ref 20–32)
CREAT SERPL-MCNC: 0.8 MG/DL (ref 0.66–1.25)
CREAT UR-MCNC: 81 MG/DL
GFR SERPL CREATININE-BSD FRML MDRD: 81 ML/MIN/{1.73_M2}
GLUCOSE SERPL-MCNC: 95 MG/DL (ref 70–99)
HDLC SERPL-MCNC: 41 MG/DL
LDLC SERPL CALC-MCNC: 30 MG/DL
MICROALBUMIN UR-MCNC: 7 MG/L
MICROALBUMIN/CREAT UR: 8.59 MG/G CR (ref 0–17)
NONHDLC SERPL-MCNC: 41 MG/DL
POTASSIUM SERPL-SCNC: 4 MMOL/L (ref 3.4–5.3)
PROT SERPL-MCNC: 7.7 G/DL (ref 6.8–8.8)
SODIUM SERPL-SCNC: 139 MMOL/L (ref 133–144)
TRIGL SERPL-MCNC: 53 MG/DL

## 2019-07-31 PROCEDURE — 36415 COLL VENOUS BLD VENIPUNCTURE: CPT | Performed by: FAMILY MEDICINE

## 2019-07-31 PROCEDURE — 73610 X-RAY EXAM OF ANKLE: CPT | Mod: RT

## 2019-07-31 PROCEDURE — 80061 LIPID PANEL: CPT | Performed by: FAMILY MEDICINE

## 2019-07-31 PROCEDURE — 73590 X-RAY EXAM OF LOWER LEG: CPT | Mod: RT

## 2019-07-31 PROCEDURE — 82043 UR ALBUMIN QUANTITATIVE: CPT | Performed by: FAMILY MEDICINE

## 2019-07-31 PROCEDURE — 99214 OFFICE O/P EST MOD 30 MIN: CPT | Performed by: FAMILY MEDICINE

## 2019-07-31 PROCEDURE — 80053 COMPREHEN METABOLIC PANEL: CPT | Performed by: FAMILY MEDICINE

## 2019-07-31 RX ORDER — LISINOPRIL 5 MG/1
5 TABLET ORAL DAILY
Qty: 90 TABLET | Refills: 3 | Status: SHIPPED | OUTPATIENT
Start: 2019-07-31 | End: 2020-09-02

## 2019-07-31 RX ORDER — SIMVASTATIN 80 MG
80 TABLET ORAL AT BEDTIME
Qty: 90 TABLET | Refills: 3 | Status: SHIPPED | OUTPATIENT
Start: 2019-07-31 | End: 2020-12-17

## 2019-07-31 RX ORDER — ALENDRONATE SODIUM 70 MG/1
TABLET ORAL
Qty: 12 TABLET | Refills: 3 | Status: SHIPPED | OUTPATIENT
Start: 2019-07-31 | End: 2019-09-16

## 2019-07-31 RX ORDER — FERROUS SULFATE 325(65) MG
325 TABLET ORAL 2 TIMES DAILY
Qty: 180 TABLET | Refills: 3 | Status: SHIPPED | OUTPATIENT
Start: 2019-07-31 | End: 2021-09-08

## 2019-07-31 ASSESSMENT — MIFFLIN-ST. JEOR: SCORE: 1401.14

## 2019-07-31 NOTE — PROGRESS NOTES
Subjective     Butch Carter is a 85 year old male who presents to clinic today for the following health issues:    HPI   Hyperlipidemia Follow-Up      Are you having any of the following symptoms? (Select all that apply)  No complaints of shortness of breath, chest pain or pressure.  No increased sweating or nausea with activity.  No left-sided neck or arm pain.  No complaints of pain in calves when walking 1-2 blocks.    Are you regularly taking any medication or supplement to lower your cholesterol?   Yes- Simvastatin    Are you having muscle aches or other side effects that you think could be caused by your cholesterol lowering medication?  No      Hypertension Follow-up      Do you check your blood pressure regularly outside of the clinic? No     Are you following a low salt diet? Yes    Are your blood pressures ever more than 140 on the top number (systolic) OR more   than 90 on the bottom number (diastolic), for example 140/90? NA      Amount of exercise or physical activity: yard work     Problems taking medications regularly: No    Medication side effects: none    Diet: low salt      Joint Pain    Onset: 2-3 weeks     Description:   Location: right knee   Character: Dull ache    Intensity: moderate    Progression of Symptoms: same    Accompanying Signs & Symptoms:  Other symptoms: radiation of pain to foot and chest pain     History:   Previous similar pain: no       Precipitating factors:   Trauma or overuse: YES- pt fell     Alleviating factors:  Improved by: nothing    Therapies Tried and outcome: ibuprofen         Patient Active Problem List   Diagnosis     Hyperlipidemia LDL goal <130     Osteoporosis     Advanced directives, counseling/discussion     ED (erectile dysfunction)     Benign non-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     Iron deficiency anemia     Insomnia, unspecified type     Essential hypertension with goal blood pressure less than 140/90     Erectile  "dysfunction, unspecified erectile dysfunction type     Past Surgical History:   Procedure Laterality Date     HERNIORRHAPHY VENTRAL N/A 8/2/2017    Procedure: HERNIORRHAPHY VENTRAL;  VENTRAL HERNIA REPAIR WITH MESH;  Surgeon: Hari Durham MD;  Location: [facility]     SURGICAL HISTORY OF -   5/04    Double Hernia     SURGICAL HISTORY OF -   2013    cataract OD       Social History     Tobacco Use     Smoking status: Never Smoker     Smokeless tobacco: Never Used   Substance Use Topics     Alcohol use: No     Family History   Problem Relation Age of Onset     Cancer Mother      Hypertension Mother            Reviewed and updated as needed this visit by Provider         Review of Systems   ROS COMP: Constitutional, HEENT, cardiovascular, pulmonary, GI, , musculoskeletal, neuro, skin, endocrine and psych systems are negative, except as otherwise noted.      Objective    /66 (BP Location: Left arm, Patient Position: Chair, Cuff Size: Adult Regular)   Pulse 65   Temp 97.8  F (36.6  C) (Oral)   Resp 18   Ht 1.753 m (5' 9\")   Wt 72.6 kg (160 lb)   SpO2 94%   BMI 23.63 kg/m    Body mass index is 23.63 kg/m .  Physical Exam   GENERAL: healthy, alert and no distress  NECK: no adenopathy, no asymmetry, masses, or scars and thyroid normal to palpation  RESP: lungs clear to auscultation - no rales, rhonchi or wheezes  CV: regular rate and rhythm, normal S1 S2, no S3 or S4, no murmur, click or rub, no peripheral edema and peripheral pulses strong  ABDOMEN: soft, nontender, no hepatosplenomegaly, no masses and bowel sounds normal  MS: no gross musculoskeletal defects noted, no edema    Diagnostic Test Results:  Labs reviewed in Epic        Assessment & Plan     1. Essential hypertension with goal blood pressure less than 140/90  Controlled. Reviewed low salt diet. RTC in 1 year for recheck.  - lisinopril (PRINIVIL/ZESTRIL) 5 MG tablet; Take 1 tablet (5 mg) by mouth daily  Dispense: 90 tablet; Refill: 3  - " Comprehensive metabolic panel (BMP + Alb, Alk Phos, ALT, AST, Total. Bili, TP)  - Albumin Random Urine Quantitative with Creat Ratio    2. Hyperlipidemia LDL goal <130  Controlled. Patient takes simvastatin 40 mg daily.  - simvastatin (ZOCOR) 80 MG tablet; Take 1 tablet (80 mg) by mouth At Bedtime  Dispense: 90 tablet; Refill: 3  - Comprehensive metabolic panel (BMP + Alb, Alk Phos, ALT, AST, Total. Bili, TP)  - Lipid Profile (Chol, Trig, HDL, LDL calc)    3. Iron deficiency anemia, unspecified iron deficiency anemia type  Stable.  - ferrous sulfate (FEROSUL) 325 (65 Fe) MG tablet; Take 1 tablet (325 mg) by mouth 2 times daily  Dispense: 180 tablet; Refill: 3    4. Age related osteoporosis, unspecified pathological fracture presence    - alendronate (FOSAMAX) 70 MG tablet; TAKE 1 TABLET BY MOUTH EVERY 7 DAYS  Dispense: 12 tablet; Refill: 3  - DX Hip/Pelvis/Spine; Future    5. Acute right ankle pain  No acute fractures. Old fracture medially. Tylenol as needed. Ice as needed.  - XR Ankle Right G/E 3 Views; Future    6. Pain of right lower leg    - XR Tibia & Fibula Right 2 Views; Future       Regular exercise  See Patient Instructions    No follow-ups on file.    Tate Hsu MD, MD  [facility]

## 2019-07-31 NOTE — LETTER
July 31, 2019      Butch Carter  [address]  [place] [address]        Dear Butch,     Your kidney, liver, electrolyte, blood sugar, cholesterol and urine tests were normal for you. Please follow up in 1 year for routine physical.     Sincerely,   Tate Hsu MD    Resulted Orders   Comprehensive metabolic panel (BMP + Alb, Alk Phos, ALT, AST, Total. Bili, TP)   Result Value Ref Range    Sodium 139 133 - 144 mmol/L    Potassium 4.0 3.4 - 5.3 mmol/L    Chloride 106 94 - 109 mmol/L    Carbon Dioxide 28 20 - 32 mmol/L    Anion Gap 5 3 - 14 mmol/L    Glucose 95 70 - 99 mg/dL      Comment:      Fasting specimen    Urea Nitrogen 14 7 - 30 mg/dL    Creatinine 0.80 0.66 - 1.25 mg/dL    GFR Estimate 81 >60 mL/min/[1.73_m2]      Comment:      Non  GFR Calc  Starting 12/18/2018, serum creatinine based estimated GFR (eGFR) will be   calculated using the Chronic Kidney Disease Epidemiology Collaboration   (CKD-EPI) equation.      GFR Estimate If Black >90 >60 mL/min/[1.73_m2]      Comment:       GFR Calc  Starting 12/18/2018, serum creatinine based estimated GFR (eGFR) will be   calculated using the Chronic Kidney Disease Epidemiology Collaboration   (CKD-EPI) equation.      Calcium 8.9 8.5 - 10.1 mg/dL    Bilirubin Total 0.4 0.2 - 1.3 mg/dL    Albumin 3.0 (L) 3.4 - 5.0 g/dL    Protein Total 7.7 6.8 - 8.8 g/dL    Alkaline Phosphatase 97 40 - 150 U/L    ALT 14 0 - 70 U/L    AST 14 0 - 45 U/L   Lipid Profile (Chol, Trig, HDL, LDL calc)   Result Value Ref Range    Cholesterol 82 <200 mg/dL    Triglycerides 53 <150 mg/dL      Comment:      Fasting specimen    HDL Cholesterol 41 >39 mg/dL    LDL Cholesterol Calculated 30 <100 mg/dL      Comment:      Desirable:       <100 mg/dl    Non HDL Cholesterol 41 <130 mg/dL   Albumin Random Urine Quantitative with Creat Ratio   Result Value Ref Range    Creatinine Urine 81 mg/dL    Albumin Urine mg/L 7 mg/L    Albumin Urine mg/g Cr 8.59 0 - 17  mg/g Cr

## 2019-07-31 NOTE — PATIENT INSTRUCTIONS
At [facility], we strive to deliver an exceptional experience to you, every time we see you.  If you receive a survey in the mail, please send us back your thoughts. We really do value your feedback.    Based on your medical history, these are the current health maintenance/preventive care services that you are due for (some may have been done at this visit.)  Health Maintenance Due   Topic Date Due     URINE DRUG SCREEN  10/09/1933     ZOSTER IMMUNIZATION (2 of 3) 09/14/2007     ADVANCE CARE PLANNING  09/25/2017     PHQ-2  01/01/2019         Suggested websites for health information:  Www.[employer] : Up to date and easily searchable information on multiple topics.  Www.medlineplus.gov : medication info, interactive tutorials, watch real surgeries online  Www.familydoctor.org : good info from the Academy of Family Physicians  Www.cdc.gov : public health info, travel advisories, epidemics (H1N1)  Www.aap.org : children's health info, normal development, vaccinations  Www.health.[facility].[place].us : [place] dept of health, public health issues in [place], N1N1    Your care team:                            Family Medicine Internal Medicine   MD Rudolph Escobar MD Shantel Branch-Fleming, MD Katya Georgiev PA-C Nam Ho, MD Pediatrics   MITZI Ramos, MD Bree Salmon CNP, MD Deborah Mielke, MD Kim Thein, APRN CNP      Clinic hours: Monday - Thursday 7 am-7 pm; Fridays 7 am-5 pm.   Urgent care: Monday - Friday 11 am-9 pm; Saturday and Sunday 9 am-5 pm.  Pharmacy : Monday -Thursday 8 am-8 pm; Friday 8 am-6 pm; Saturday and Sunday 9 am-5 pm.     Clinic: [phone]   Pharmacy: [phone]

## 2019-08-21 ENCOUNTER — ANCILLARY PROCEDURE (OUTPATIENT)
Dept: BONE DENSITY | Facility: CLINIC | Age: 84
End: 2019-08-21
Attending: FAMILY MEDICINE
Payer: MEDICARE

## 2019-08-21 DIAGNOSIS — M81.0 AGE RELATED OSTEOPOROSIS, UNSPECIFIED PATHOLOGICAL FRACTURE PRESENCE: ICD-10-CM

## 2019-08-21 PROCEDURE — 77080 DXA BONE DENSITY AXIAL: CPT | Performed by: INTERNAL MEDICINE

## 2019-09-16 ENCOUNTER — TELEPHONE (OUTPATIENT)
Dept: FAMILY MEDICINE | Facility: CLINIC | Age: 84
End: 2019-09-16

## 2019-09-16 NOTE — TELEPHONE ENCOUNTER
Routing to provider to advise; I don't see that results were interpreted by provider.         Gillian Mark RN, BSN, PHN

## 2019-09-16 NOTE — TELEPHONE ENCOUNTER
Reason for Call:  Request for results:    Name of test or procedure: Bone Density tests results    Date of test of procedure: 8/21/2019     Location of the test or procedure: [place]    OK to leave the result message on voice mail or with a family member? YES    Phone number Patient can be reached at:  Home number on file [phone] (home)    Additional comments: Please call and mail out results.    Call taken on 9/16/2019 at 8:48 AM by Renetta Humphrey

## 2019-09-16 NOTE — TELEPHONE ENCOUNTER
Please let patient know that he does not have osteoporosis. He does have some bone density loss but stable. He should make sure that he continue to take calcium and vitamin d. He can discontinue the Fosamax at this time. We will recheck another scan in 2 years.    Tate Hsu MD

## 2019-09-16 NOTE — TELEPHONE ENCOUNTER
Letter created and sent to home per patient's request. Updated patient;'s wife with results. CTC is on file to speak with wife.      Gillian Mark RN, BSN, PHN

## 2019-09-16 NOTE — LETTER
09/16/19          Dear Butch,       Your DEXA results are as follows:    PROCEDURE:  Bone density scanning was performed using DXA technology of the lumbar spine and hip.  Scanning was performed on a Lunar Prodigy scanner.  Reporting is completed in the form of a T-score.  The T-score represents the standard deviation from peak bone mass based on a young healthy adult.     REFERENCE T-SCORES:       Normal                -1.0 and greater                                 Osteopenia         Between -1.0 and -2.5                                           Osteoporosis     -2.5 and less                                      RISK FACTORS:  Male > 70 years old, Height loss of 2 inches, follow up osteopenia     CURRENT TREATMENT:  Calcium, Fosamax (alendronate), Vitamin D     FINDINGS:               Lumbar Spine (L1-L4)      T-score:  -0.8, degenerative changes present               Left Femoral Neck            T-score:  -1.7               Right Femoral Neck          T-score:  -1.3                   Lumbar (L1-L4) BMD: 1.129  Previous: 1.069                          Total Hip Mean BMD: 0.979  Previous: 0.968     Comparison is made to another DXA performed on the same Lunar Prodigy  machine on 10/2/2012.       IMPRESSION  Osteopenia., Degenerative changes of the lumbar spine which may falsely elevate results.     There has been significant increase in bone density of the lumbar spine. There has been no significant change in bone density of the hip(s).     Recommendations include ensuring adequate Calcium and Vitamin D.     Follow up can be considered in 2 years if medication is discontinued for drug holiday, otherwise 5 years.   ___________________  Denisse Arrieta M.D.  Per Dr. Hsu:      Please let patient know that he does not have osteoporosis. He does have some bone density loss but stable. He should make sure that he continue to take calcium and vitamin d. He can discontinue the Fosamax at this time. We will recheck another  scan in 2 years.     Tate Hus MD    Let us know if you need anything else. Take care.         [facility] Nurse Triage Team for Dr. Tate Hsu

## 2020-01-02 DIAGNOSIS — G47.00 INSOMNIA, UNSPECIFIED TYPE: ICD-10-CM

## 2020-01-02 NOTE — TELEPHONE ENCOUNTER
Requested Prescriptions   Pending Prescriptions Disp Refills     zolpidem (AMBIEN) 10 MG tablet [Pharmacy Med Name: Zolpidem Tartrate Oral Tablet 10 MG]        Last Written Prescription Date:  06/18/19  Last Fill Quantity: 90,   # refills: 3  Last Office Visit: 07/31/19-  Future Office visit:       Routing refill request to provider for review/approval because:  Drug not on the [facility], [facility] or [facility] refill protocol or controlled substance 90 tablet 2     Sig: TAKE 1 TABLET BY MOUTH EVERY EVENING AS NEEDED FOR SLEEP       There is no refill protocol information for this order

## 2020-01-05 NOTE — TELEPHONE ENCOUNTER
New pharmacy requesting.   Routing refill request to provider for review/approval because:  Drug not on the [facility] refill protocol   Dot Gomez RN  [facility]

## 2020-01-06 RX ORDER — ZOLPIDEM TARTRATE 10 MG/1
TABLET ORAL
Qty: 90 TABLET | Refills: 1 | Status: SHIPPED | OUTPATIENT
Start: 2020-01-06 | End: 2020-09-03

## 2020-01-06 NOTE — TELEPHONE ENCOUNTER
Reason for Call:  Other prescription    Detailed comments: Wants daughter to be able to pickup medication for him.    Phone Number Patient can be reached at: Madisyn Allen at     Best Time: any    Can we leave a detailed message on this number? YES    Call taken on 1/6/2020 at 8:58 AM by Renetta Humphrey

## 2020-09-02 ENCOUNTER — OFFICE VISIT (OUTPATIENT)
Dept: FAMILY MEDICINE | Facility: CLINIC | Age: 85
End: 2020-09-02
Payer: MEDICARE

## 2020-09-02 VITALS
SYSTOLIC BLOOD PRESSURE: 119 MMHG | HEIGHT: 69 IN | DIASTOLIC BLOOD PRESSURE: 67 MMHG | OXYGEN SATURATION: 99 % | RESPIRATION RATE: 16 BRPM | HEART RATE: 64 BPM | TEMPERATURE: 95.5 F | WEIGHT: 148 LBS | BODY MASS INDEX: 21.92 KG/M2

## 2020-09-02 DIAGNOSIS — Z23 ENCOUNTER FOR IMMUNIZATION: ICD-10-CM

## 2020-09-02 DIAGNOSIS — N40.1 BENIGN NON-NODULAR PROSTATIC HYPERPLASIA WITH LOWER URINARY TRACT SYMPTOMS: ICD-10-CM

## 2020-09-02 DIAGNOSIS — Z00.00 ENCOUNTER FOR MEDICARE ANNUAL WELLNESS EXAM: Primary | ICD-10-CM

## 2020-09-02 DIAGNOSIS — E78.5 HYPERLIPIDEMIA LDL GOAL <130: ICD-10-CM

## 2020-09-02 DIAGNOSIS — D50.9 IRON DEFICIENCY ANEMIA, UNSPECIFIED IRON DEFICIENCY ANEMIA TYPE: ICD-10-CM

## 2020-09-02 DIAGNOSIS — I10 ESSENTIAL HYPERTENSION WITH GOAL BLOOD PRESSURE LESS THAN 140/90: ICD-10-CM

## 2020-09-02 LAB
ALBUMIN SERPL-MCNC: 3 G/DL (ref 3.4–5)
ALP SERPL-CCNC: 109 U/L (ref 40–150)
ALT SERPL W P-5'-P-CCNC: 15 U/L (ref 0–70)
ANION GAP SERPL CALCULATED.3IONS-SCNC: 3 MMOL/L (ref 3–14)
AST SERPL W P-5'-P-CCNC: 14 U/L (ref 0–45)
BASOPHILS # BLD AUTO: 0 10E9/L (ref 0–0.2)
BASOPHILS NFR BLD AUTO: 0.2 %
BILIRUB SERPL-MCNC: 0.4 MG/DL (ref 0.2–1.3)
BUN SERPL-MCNC: 16 MG/DL (ref 7–30)
CALCIUM SERPL-MCNC: 9.4 MG/DL (ref 8.5–10.1)
CHLORIDE SERPL-SCNC: 107 MMOL/L (ref 94–109)
CHOLEST SERPL-MCNC: 103 MG/DL
CO2 SERPL-SCNC: 31 MMOL/L (ref 20–32)
CREAT SERPL-MCNC: 0.9 MG/DL (ref 0.66–1.25)
CREAT UR-MCNC: 112 MG/DL
DIFFERENTIAL METHOD BLD: ABNORMAL
EOSINOPHIL # BLD AUTO: 0.1 10E9/L (ref 0–0.7)
EOSINOPHIL NFR BLD AUTO: 1.3 %
ERYTHROCYTE [DISTWIDTH] IN BLOOD BY AUTOMATED COUNT: 14.7 % (ref 10–15)
GFR SERPL CREATININE-BSD FRML MDRD: 77 ML/MIN/{1.73_M2}
GLUCOSE SERPL-MCNC: 91 MG/DL (ref 70–99)
HCT VFR BLD AUTO: 36.2 % (ref 40–53)
HDLC SERPL-MCNC: 43 MG/DL
HGB BLD-MCNC: 11.4 G/DL (ref 13.3–17.7)
LDLC SERPL CALC-MCNC: 47 MG/DL
LYMPHOCYTES # BLD AUTO: 1.7 10E9/L (ref 0.8–5.3)
LYMPHOCYTES NFR BLD AUTO: 28.2 %
MCH RBC QN AUTO: 30.2 PG (ref 26.5–33)
MCHC RBC AUTO-ENTMCNC: 31.5 G/DL (ref 31.5–36.5)
MCV RBC AUTO: 96 FL (ref 78–100)
MICROALBUMIN UR-MCNC: 18 MG/L
MICROALBUMIN/CREAT UR: 16.16 MG/G CR (ref 0–17)
MONOCYTES # BLD AUTO: 0.8 10E9/L (ref 0–1.3)
MONOCYTES NFR BLD AUTO: 12.6 %
NEUTROPHILS # BLD AUTO: 3.4 10E9/L (ref 1.6–8.3)
NEUTROPHILS NFR BLD AUTO: 57.7 %
NONHDLC SERPL-MCNC: 60 MG/DL
PLATELET # BLD AUTO: 223 10E9/L (ref 150–450)
POTASSIUM SERPL-SCNC: 4.9 MMOL/L (ref 3.4–5.3)
PROT SERPL-MCNC: 8.4 G/DL (ref 6.8–8.8)
RBC # BLD AUTO: 3.78 10E12/L (ref 4.4–5.9)
SODIUM SERPL-SCNC: 141 MMOL/L (ref 133–144)
TRIGL SERPL-MCNC: 67 MG/DL
WBC # BLD AUTO: 6 10E9/L (ref 4–11)

## 2020-09-02 PROCEDURE — G0439 PPPS, SUBSEQ VISIT: HCPCS | Performed by: FAMILY MEDICINE

## 2020-09-02 PROCEDURE — 80061 LIPID PANEL: CPT | Performed by: FAMILY MEDICINE

## 2020-09-02 PROCEDURE — 85025 COMPLETE CBC W/AUTO DIFF WBC: CPT | Performed by: FAMILY MEDICINE

## 2020-09-02 PROCEDURE — 82043 UR ALBUMIN QUANTITATIVE: CPT | Performed by: FAMILY MEDICINE

## 2020-09-02 PROCEDURE — 36415 COLL VENOUS BLD VENIPUNCTURE: CPT | Performed by: FAMILY MEDICINE

## 2020-09-02 PROCEDURE — 80053 COMPREHEN METABOLIC PANEL: CPT | Performed by: FAMILY MEDICINE

## 2020-09-02 RX ORDER — TERAZOSIN 5 MG/1
CAPSULE ORAL
Qty: 90 CAPSULE | Refills: 3 | Status: SHIPPED | OUTPATIENT
Start: 2020-09-02 | End: 2020-12-17

## 2020-09-02 ASSESSMENT — MIFFLIN-ST. JEOR: SCORE: 1341.7

## 2020-09-02 ASSESSMENT — PAIN SCALES - GENERAL: PAINLEVEL: NO PAIN (0)

## 2020-09-02 NOTE — LETTER
September 2, 2020      Butch Carter  [address]  [place] [address]        Dear ,    Your blood counts are stable. Your kidney, liver, electrolyte, blood sugar, cholesterol and urine tests were normal. Please follow up in 1 year for routine physical.     Sincerely,   Tate Hsu MD       Results for orders placed or performed in visit on 09/02/20   Albumin Random Urine Quantitative with Creat Ratio     Status: None   Result Value Ref Range    Creatinine Urine 112 mg/dL    Albumin Urine mg/L 18 mg/L    Albumin Urine mg/g Cr 16.16 0 - 17 mg/g Cr   Comprehensive metabolic panel (BMP + Alb, Alk Phos, ALT, AST, Total. Bili, TP)     Status: Abnormal   Result Value Ref Range    Sodium 141 133 - 144 mmol/L    Potassium 4.9 3.4 - 5.3 mmol/L    Chloride 107 94 - 109 mmol/L    Carbon Dioxide 31 20 - 32 mmol/L    Anion Gap 3 3 - 14 mmol/L    Glucose 91 70 - 99 mg/dL    Urea Nitrogen 16 7 - 30 mg/dL    Creatinine 0.90 0.66 - 1.25 mg/dL    GFR Estimate 77 >60 mL/min/[1.73_m2]    GFR Estimate If Black 89 >60 mL/min/[1.73_m2]    Calcium 9.4 8.5 - 10.1 mg/dL    Bilirubin Total 0.4 0.2 - 1.3 mg/dL    Albumin 3.0 (L) 3.4 - 5.0 g/dL    Protein Total 8.4 6.8 - 8.8 g/dL    Alkaline Phosphatase 109 40 - 150 U/L    ALT 15 0 - 70 U/L    AST 14 0 - 45 U/L   CBC with platelets and differential     Status: Abnormal   Result Value Ref Range    WBC 6.0 4.0 - 11.0 10e9/L    RBC Count 3.78 (L) 4.4 - 5.9 10e12/L    Hemoglobin 11.4 (L) 13.3 - 17.7 g/dL    Hematocrit 36.2 (L) 40.0 - 53.0 %    MCV 96 78 - 100 fl    MCH 30.2 26.5 - 33.0 pg    MCHC 31.5 31.5 - 36.5 g/dL    RDW 14.7 10.0 - 15.0 %    Platelet Count 223 150 - 450 10e9/L    % Neutrophils 57.7 %    % Lymphocytes 28.2 %    % Monocytes 12.6 %    % Eosinophils 1.3 %    % Basophils 0.2 %    Absolute Neutrophil 3.4 1.6 - 8.3 10e9/L    Absolute Lymphocytes 1.7 0.8 - 5.3 10e9/L    Absolute Monocytes 0.8 0.0 - 1.3 10e9/L    Absolute Eosinophils 0.1 0.0 - 0.7 10e9/L     Absolute Basophils 0.0 0.0 - 0.2 10e9/L    Diff Method Automated Method    Lipid panel reflex to direct LDL Fasting     Status: None   Result Value Ref Range    Cholesterol 103 <200 mg/dL    Triglycerides 67 <150 mg/dL    HDL Cholesterol 43 >39 mg/dL    LDL Cholesterol Calculated 47 <100 mg/dL    Non HDL Cholesterol 60 <130 mg/dL

## 2020-09-02 NOTE — PROGRESS NOTES
"  SUBJECTIVE:   Butch Carter is a 86 year old male who presents for Preventive Visit.  Are you in the first 12 months of your Medicare Part B coverage?  No    Physical Health:    In general, how would you rate your overall physical health? good    Outside of work, how many days during the week do you exercise? none    Outside of work, approximately how many minutes a day do you exercise?not applicable    If you drink alcohol do you typically have >3 drinks per day or >7 drinks per week? No    Do you usually eat at least 4 servings of fruit and vegetables a day, include whole grains & fiber and avoid regularly eating high fat or \"junk\" foods? Yes    Do you have any problems taking medications regularly?  No    Do you have any side effects from medications? none    Needs assistance for the following daily activities: no assistance needed    Which of the following safety concerns are present in your home?  none identified     Hearing impairment: Yes    In the past 6 months, have you been bothered by leaking of urine? no    Mental Health:    In general, how would you rate your overall mental or emotional health? good  PHQ-2 Score:      Do you feel safe in your environment? Yes    Have you ever done Advance Care Planning? (For example, a Health Directive, POLST, or a discussion with a medical provider or your loved ones about your wishes): Yes, patient states has an Advance Care Planning document and will bring a copy to the clinic.    Additional concerns to address?  No    Fall risk:  Fallen 2 or more times in the past year?: No  Any fall with injury in the past year?: No     Cognitive Screenin) Repeat 3 items (Leader, Season, Table)    2) Clock draw: NORMAL  3) 3 item recall: Recalls NO objects   Results: 0 items recalled: PROBABLE COGNITIVE IMPAIRMENT, **INFORM PROVIDER**  NORMAL CLOCK    Mini-CogTM Copyright ZHANNA Martins. Licensed by the author for use in [facility]; reprinted with permission " (mary ellen@[facility]). All rights reserved.      Do you have sleep apnea, excessive snoring or daytime drowsiness?: no          Reviewed and updated as needed this visit by clinical staff  Tobacco  Allergies  Meds  Med Hx  Surg Hx  Fam Hx  Soc Hx        Reviewed and updated as needed this visit by Provider        Social History     Tobacco Use     Smoking status: Never Smoker     Smokeless tobacco: Never Used   Substance Use Topics     Alcohol use: No                           Current providers sharing in care for this patient include:     Patient Care Team:  Tate Hsu MD as PCP - General (Family Practice)  Tate Hsu MD as Assigned PCP    The following health maintenance items are reviewed in Epic and correct as of today:  Health Maintenance   Topic Date Due     URINE DRUG SCREEN  10/09/1933     ADVANCE CARE PLANNING  09/25/2017     MEDICARE ANNUAL WELLNESS VISIT  09/19/2019     FALL RISK ASSESSMENT  09/19/2019     PHQ-2  01/01/2020     BMP  07/31/2020     MICROALBUMIN  07/31/2020     INFLUENZA VACCINE (1) 09/01/2020     ZOSTER IMMUNIZATION (3 of 3) 09/25/2019     DTAP/TDAP/TD IMMUNIZATION (4 - Td) 09/25/2022     PNEUMOCOCCAL IMMUNIZATION 65+ LOW/MEDIUM RISK  Completed     IPV IMMUNIZATION  Aged Out     MENINGITIS IMMUNIZATION  Aged Out     HEPATITIS B IMMUNIZATION  Aged Out     Lab work is in process  Labs reviewed in EPIC  BP Readings from Last 3 Encounters:   09/02/20 119/67   07/31/19 123/66   09/19/18 121/67    Wt Readings from Last 3 Encounters:   09/02/20 67.1 kg (148 lb)   07/31/19 72.6 kg (160 lb)   09/19/18 73 kg (161 lb)                  Patient Active Problem List   Diagnosis     Hyperlipidemia LDL goal <130     Osteoporosis     Advanced directives, counseling/discussion     ED (erectile dysfunction)     Benign non-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     Iron deficiency anemia     Insomnia, unspecified type     Essential hypertension with goal blood pressure less than 140/90      Erectile dysfunction, unspecified erectile dysfunction type     Past Surgical History:   Procedure Laterality Date     HERNIORRHAPHY VENTRAL N/A 8/2/2017    Procedure: HERNIORRHAPHY VENTRAL;  VENTRAL HERNIA REPAIR WITH MESH;  Surgeon: Hari Durham MD;  [facility]: [facility]     SURGICAL HISTORY OF -   5/04    Double Hernia     SURGICAL HISTORY OF -   2013    cataract OD       Social History     Tobacco Use     Smoking status: Never Smoker     Smokeless tobacco: Never Used   Substance Use Topics     Alcohol use: No     Family History   Problem Relation Age of Onset     Cancer Mother      Hypertension Mother          Current Outpatient Medications   Medication Sig Dispense Refill     Multiple Vitamins-Minerals (CENTRUM SILVER ADULT 50+ PO)        Omega-3 Fatty Acids (FISH OIL) 1000 MG CPDR Take by mouth 2 times daily       terazosin (HYTRIN) 5 MG capsule TAKE 1 CAPSULE BY MOUTH AT BEDTIME 90 capsule 3     zolpidem (AMBIEN) 10 MG tablet TAKE 1 TABLET BY MOUTH EVERY EVENING AS NEEDED FOR SLEEP 90 tablet 1     aspirin 81 MG tablet Take 1 tablet (81 mg) by mouth daily * 100 tablet 1     calcium carbonate (OS-SUSHIL 500 MG [place]. [place]) 1250 MG tablet Take 1 tablet by mouth 2 times daily       Cyanocobalamin (B-12 PO)        ferrous sulfate (FEROSUL) 325 (65 Fe) MG tablet Take 1 tablet (325 mg) by mouth 2 times daily 180 tablet 3     Glucosamine-Chondroit-Vit C-Mn (GLUCOSAMINE CHONDR 1500 COMPLX PO)        HYDROcodone-acetaminophen (NORCO) 5-325 MG per tablet Take 1-2 tablets by mouth every 4 hours as needed for other (Moderate to Severe Pain) 30 tablet 0     ibuprofen (ADVIL/MOTRIN) 600 MG tablet Take 1 tablet (600 mg) by mouth every 8 hours as needed for pain (mild) 30 tablet 0     sildenafil (VIAGRA) 100 MG tablet Take 0.5-1 tablets ( mg) by mouth daily as needed for erectile dysfunction Take 30 min to 4 hours before intercourse.  Never use with nitroglycerin, terazosin or doxazosin. (Patient not taking: Reported on  "9/2/2020) 20 tablet 5     simvastatin (ZOCOR) 80 MG tablet Take 1 tablet (80 mg) by mouth At Bedtime (Patient not taking: Reported on 9/2/2020) 90 tablet 3     VITAMIN D, CHOLECALCIFEROL, PO Take by mouth daily       No Known Allergies    ROS:  Constitutional, HEENT, cardiovascular, pulmonary, GI, , musculoskeletal, neuro, skin, endocrine and psych systems are negative, except as otherwise noted.    OBJECTIVE:   /67 (BP Location: Left arm, Patient Position: Sitting, Cuff Size: Adult Regular)   Pulse 64   Temp 95.5  F (35.3  C) (Tympanic)   Resp 16   Ht 1.753 m (5' 9\")   Wt 67.1 kg (148 lb)   SpO2 99%   BMI 21.86 kg/m   Estimated body mass index is 21.86 kg/m  as calculated from the following:    Height as of this encounter: 1.753 m (5' 9\").    Weight as of this encounter: 67.1 kg (148 lb).  EXAM:   GENERAL: healthy, alert and no distress  NECK: no adenopathy, no asymmetry, masses, or scars and thyroid normal to palpation  RESP: lungs clear to auscultation - no rales, rhonchi or wheezes  CV: regular rate and rhythm, normal S1 S2, no S3 or S4, no murmur, click or rub, no peripheral edema and peripheral pulses strong  ABDOMEN: soft, nontender, no hepatosplenomegaly, no masses and bowel sounds normal  MS: no gross musculoskeletal defects noted, no edema    Diagnostic Test Results:  Labs reviewed in Epic    ASSESSMENT / PLAN:   1. Encounter for Medicare annual wellness exam  As below.    2. Essential hypertension with goal blood pressure less than 140/90  Controlled. Patient off lisinopril. Okay to discontinue at this time. Monitor.  - Albumin Random Urine Quantitative with Creat Ratio  - Comprehensive metabolic panel (BMP + Alb, Alk Phos, ALT, AST, Total. Bili, TP)    3. Hyperlipidemia LDL goal <130  Patient took himself off simvastatin. Recheck lipids. If elevated, will restart.  - Comprehensive metabolic panel (BMP + Alb, Alk Phos, ALT, AST, Total. Bili, TP)  - Lipid panel reflex to direct LDL " "Fasting    4. Iron deficiency anemia, unspecified iron deficiency anemia type    - CBC with platelets and differential    5. Encounter for immunization  Patient wanted to delay flu vaccine to a later date.    6. Benign non-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    - terazosin (HYTRIN) 5 MG capsule; TAKE 1 CAPSULE BY MOUTH AT BEDTIME  Dispense: 90 capsule; Refill: 3    COUNSELING:  Reviewed preventive health counseling, as reflected in patient instructions       Regular exercise       Healthy diet/nutrition       Vision screening       Hearing screening    Estimated body mass index is 21.86 kg/m  as calculated from the following:    Height as of this encounter: 1.753 m (5' 9\").    Weight as of this encounter: 67.1 kg (148 lb).        He reports that he has never smoked. He has never used smokeless tobacco.    Appropriate preventive services were discussed with this patient, including applicable screening as appropriate for cardiovascular disease, diabetes, osteopenia/osteoporosis, and glaucoma.  As appropriate for age/gender, discussed screening for colorectal cancer, prostate cancer, breast cancer, and cervical cancer. Checklist reviewing preventive services available has been given to the patient.    Reviewed patients plan of care and provided an AVS. The Basic Care Plan (routine screening as documented in Health Maintenance) for Butch meets the Care Plan requirement. This Care Plan has been established and reviewed with the Patient.    Counseling Resources:  ATP IV Guidelines  Pooled Cohorts Equation Calculator  Breast Cancer Risk Calculator  BRCA-Related Cancer Risk Assessment: FHS-7 Tool  FRAX Risk Assessment  ICSI Preventive Guidelines  Dietary Guidelines for Americans, 2010  USDA's MyPlate  ASA Prophylaxis  Lung CA Screening    Tate Hsu MD, MD  [facility]  "

## 2020-09-02 NOTE — PATIENT INSTRUCTIONS
Patient Education   Personalized Prevention Plan  You are due for the preventive services outlined below.  Your care team is available to assist you in scheduling these services.  If you have already completed any of these items, please share that information with your care team to update in your medical record.  Health Maintenance Due   Topic Date Due     URINE DRUG SCREEN  10/09/1933     Discuss Advance Care Planning  09/25/2017     Annual Wellness Visit  09/19/2019     FALL RISK ASSESSMENT  09/19/2019     PHQ-2  01/01/2020     Basic Metabolic Panel  07/31/2020     Kidney Microalbumin Urine Test  07/31/2020     Flu Vaccine (1) 09/01/2020     Zoster (Shingles) Vaccine (3 of 3) 09/25/2019         At [facility], we strive to deliver an exceptional experience to you, every time we see you. If you receive a survey, please complete it as we do value your feedback.  If you have MyChart, you can expect to receive results automatically within 24 hours of their completion.  Your provider will send a note interpreting your results as well.   If you do not have MyChart, you should receive your results in about a week by mail.    Your care team:                            Family Medicine Internal Medicine   MD Rudolph Escobar MD Shantel Branch-Fleming, MD Srinivasa Vaka, MD Katya Georgiev PA-C Megan Hill, APRN CNP    Tate Hsu MD Pediatrics   Miko Hare, PADamienC  Sunita Malcolm, MD Francisca Kapoor APRN CNP   MD Bree Gallego MD Deborah Mielke, MD Kim Thein, APRN CNP  Meeta Medina, PASYDNI Atkins, MD Judi Richards MD Angela Wermerskirchen, MD      Clinic hours: Monday - Thursday 7 am-7 pm; Fridays 7 am-5 pm.   Urgent care: Monday - Friday 11 am-9 pm; Saturday and Sunday 9 am-5 pm.    Clinic: [phone]       [facility]: Monday - Thursday 8 am - 7 pm; Friday 8 am  - 6 pm  [facility]: [phone]     Use www.[email or web address] for 24/7 diagnosis and treatment of dozens of conditions.

## 2020-09-03 DIAGNOSIS — G47.00 INSOMNIA, UNSPECIFIED TYPE: ICD-10-CM

## 2020-09-03 RX ORDER — ZOLPIDEM TARTRATE 10 MG/1
TABLET ORAL
Qty: 90 TABLET | Refills: 0 | Status: SHIPPED | OUTPATIENT
Start: 2020-09-03 | End: 2020-11-27

## 2020-09-03 NOTE — TELEPHONE ENCOUNTER
Requested Prescriptions   Pending Prescriptions Disp Refills     zolpidem (AMBIEN) 10 MG tablet [Pharmacy Med Name: Zolpidem Tartrate Oral Tablet 10 MG] 90 tablet 0     Sig: TAKE 1 TABLET BY MOUTH EVERY EVENING AS NEEDED FOR SLEEP       There is no refill protocol information for this order        Routing refill request to provider for review/approval because:  Drug not on the [facility] refill protocol       Gillian Mark RN, BSN, PHN

## 2020-11-24 DIAGNOSIS — G47.00 INSOMNIA, UNSPECIFIED TYPE: ICD-10-CM

## 2020-11-27 RX ORDER — ZOLPIDEM TARTRATE 10 MG/1
TABLET ORAL
Qty: 90 TABLET | Refills: 0 | Status: SHIPPED | OUTPATIENT
Start: 2020-11-27 | End: 2020-12-17

## 2020-11-27 NOTE — TELEPHONE ENCOUNTER
Requested Prescriptions   Pending Prescriptions Disp Refills     zolpidem (AMBIEN) 10 MG tablet [Pharmacy Med Name: Zolpidem Tartrate Oral Tablet 10 MG] 90 tablet 0     Sig: TAKE ONE TABLET BY MOUTH EVERY EVENING AS NEEDED FOR SLEEP       There is no refill protocol information for this order        Last Written Prescription Date:  09/3/20  Last Fill Quantity: 90,  # refills: 0   Last office visit: 9/2/2020 with prescribing provider:     Future Office Visit:

## 2020-11-30 ENCOUNTER — NEW PATIENT (OUTPATIENT)
Dept: URBAN - METROPOLITAN AREA CLINIC 24 | Facility: CLINIC | Age: 85
End: 2020-11-30
Payer: MEDICARE

## 2020-11-30 DIAGNOSIS — H35.3221 EXUDATIVE MACULAR DEGENERATION, WITH ACTIVE CHOROIDAL NEOVASCULARIZATION, LEFT EYE: Primary | ICD-10-CM

## 2020-11-30 DIAGNOSIS — H35.3112 NONEXUDATIVE MACULAR DEGENERATION, INTERMEDIATE DRY STAGE, RIGHT EYE: ICD-10-CM

## 2020-11-30 PROCEDURE — 92004 COMPRE OPH EXAM NEW PT 1/>: CPT | Performed by: OPHTHALMOLOGY

## 2020-11-30 PROCEDURE — 92134 CPTRZ OPH DX IMG PST SGM RTA: CPT | Performed by: OPHTHALMOLOGY

## 2020-11-30 PROCEDURE — 92242 FLUORESCEIN&ICG ANGIOGRAPHY: CPT | Performed by: OPHTHALMOLOGY

## 2020-11-30 PROCEDURE — 67028 INJECTION EYE DRUG: CPT | Performed by: OPHTHALMOLOGY

## 2020-11-30 ASSESSMENT — INTRAOCULAR PRESSURE
OS: 8
OD: 8

## 2020-12-15 ENCOUNTER — TELEPHONE (OUTPATIENT)
Dept: FAMILY MEDICINE | Facility: CLINIC | Age: 85
End: 2020-12-15

## 2020-12-15 DIAGNOSIS — G47.00 INSOMNIA, UNSPECIFIED TYPE: ICD-10-CM

## 2020-12-15 DIAGNOSIS — N52.9 ERECTILE DYSFUNCTION, UNSPECIFIED ERECTILE DYSFUNCTION TYPE: ICD-10-CM

## 2020-12-15 DIAGNOSIS — E78.5 HYPERLIPIDEMIA LDL GOAL <130: ICD-10-CM

## 2020-12-15 NOTE — TELEPHONE ENCOUNTER
Called patient to clarify medications to be refilled. Patient was confused on what he is taking or should be taking. Scheduled patient for telephone visit with PCP for 12/17/20 at 8:40.    ZAYNAB Goodman MA

## 2020-12-15 NOTE — TELEPHONE ENCOUNTER
Reason for Call:  Medication or medication refill: Medication refill    Do you use a [facility]?  Name of the pharmacy and phone number for the current request:  [facility]     [address], [place] [address]    Name of the medication requested: All medications     Other request: Patient switching pharmacy     Can we leave a detailed message on this number? YES    Phone number patient can be reached at: Cell number on file:    Telephone Information:   Mobile [phone]       Best Time: Anytime    Call taken on 12/15/2020 at 1:09 PM by Martha Reyez

## 2020-12-17 ENCOUNTER — VIRTUAL VISIT (OUTPATIENT)
Dept: FAMILY MEDICINE | Facility: CLINIC | Age: 85
End: 2020-12-17
Payer: MEDICARE

## 2020-12-17 DIAGNOSIS — G47.00 INSOMNIA, UNSPECIFIED TYPE: ICD-10-CM

## 2020-12-17 DIAGNOSIS — N40.1 BENIGN NON-NODULAR PROSTATIC HYPERPLASIA WITH LOWER URINARY TRACT SYMPTOMS: ICD-10-CM

## 2020-12-17 PROCEDURE — 99441 PR PHYSICIAN TELEPHONE EVALUATION 5-10 MIN: CPT | Mod: 95 | Performed by: FAMILY MEDICINE

## 2020-12-17 RX ORDER — ZOLPIDEM TARTRATE 10 MG/1
TABLET ORAL
Qty: 90 TABLET | Refills: 3 | Status: SHIPPED | OUTPATIENT
Start: 2020-12-17 | End: 2020-12-29

## 2020-12-17 RX ORDER — TERAZOSIN 5 MG/1
CAPSULE ORAL
Qty: 90 CAPSULE | Refills: 3 | Status: SHIPPED | OUTPATIENT
Start: 2020-12-17 | End: 2021-09-08

## 2020-12-17 NOTE — PROGRESS NOTES
"Butch Carter is a 87 year old male who is being evaluated via a billable telephone visit.      The patient has been notified of following:     \"This telephone visit will be conducted via a call between you and your physician/provider. We have found that certain health care needs can be provided without the need for a physical exam.  This service lets us provide the care you need with a short phone conversation.  If a prescription is necessary we can send it directly to your pharmacy.  If lab work is needed we can place an order for that and you can then stop by our lab to have the test done at a later time.    Telephone visits are billed at different rates depending on your insurance coverage. During this emergency period, for some insurers they may be billed the same as an in-person visit.  Please reach out to your insurance provider with any questions.    If during the course of the call the physician/provider feels a telephone visit is not appropriate, you will not be charged for this service.\"    Patient has given verbal consent for Telephone visit?  Yes    What phone number would you like to be contacted at? mobile    How would you like to obtain your AVS? Asad    Subjective     Butch Carter is a 87 year old male who presents via phone visit today for the following health issues:    HPI     Patient currently in [place] and needing refilled of both Ambien and terazosin. Both medications are doing well for him. No side effects.    Review of Systems   Constitutional, HEENT, cardiovascular, pulmonary, GI, , musculoskeletal, neuro, skin, endocrine and psych systems are negative, except as otherwise noted.       Objective          Vitals:  No vitals were obtained today due to virtual visit.    healthy, alert and no distress  PSYCH: Alert and oriented times 3; coherent speech, normal   rate and volume, able to articulate logical thoughts, able   to abstract reason, no tangential thoughts, no hallucinations   or " delusions  His affect is normal  RESP: No cough, no audible wheezing, able to talk in full sentences  Remainder of exam unable to be completed due to telephone visits          Assessment/Plan:    Assessment & Plan     Benign non-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  rx refilled.  - terazosin (HYTRIN) 5 MG capsule; TAKE 1 CAPSULE BY MOUTH AT BEDTIME    Insomnia, unspecified type  rx refilled.  - zolpidem (AMBIEN) 10 MG tablet; TAKE ONE TABLET BY MOUTH EVERY EVENING AS NEEDED FOR SLEEP        Regular exercise  See Patient Instructions    Return in about 9 months (around 9/17/2021) for Physical Exam.    Tate Hsu MD, MD  [facility]    Phone call duration:  5 minutes

## 2020-12-28 DIAGNOSIS — G47.00 INSOMNIA, UNSPECIFIED TYPE: ICD-10-CM

## 2020-12-28 NOTE — TELEPHONE ENCOUNTER
Routing refill request to provider for review/approval because:  Drug not on the [facility] refill protocol     Shakira Yap RN, [facility] Triage

## 2020-12-29 RX ORDER — ZOLPIDEM TARTRATE 10 MG/1
TABLET ORAL
Qty: 90 TABLET | Refills: 0 | Status: SHIPPED | OUTPATIENT
Start: 2020-12-29 | End: 2021-03-12

## 2021-01-11 ENCOUNTER — FOLLOW UP ESTABLISHED (OUTPATIENT)
Dept: URBAN - METROPOLITAN AREA CLINIC 24 | Facility: CLINIC | Age: 86
End: 2021-01-11
Payer: MEDICARE

## 2021-01-11 PROCEDURE — 92134 CPTRZ OPH DX IMG PST SGM RTA: CPT | Performed by: OPHTHALMOLOGY

## 2021-01-11 PROCEDURE — 67028 INJECTION EYE DRUG: CPT | Performed by: OPHTHALMOLOGY

## 2021-01-11 ASSESSMENT — INTRAOCULAR PRESSURE
OD: 10
OS: 10

## 2021-02-22 ENCOUNTER — FOLLOW UP ESTABLISHED (OUTPATIENT)
Dept: URBAN - METROPOLITAN AREA CLINIC 24 | Facility: CLINIC | Age: 86
End: 2021-02-22
Payer: MEDICARE

## 2021-02-22 PROCEDURE — 67028 INJECTION EYE DRUG: CPT | Performed by: OPHTHALMOLOGY

## 2021-02-22 PROCEDURE — 92134 CPTRZ OPH DX IMG PST SGM RTA: CPT | Performed by: OPHTHALMOLOGY

## 2021-02-22 ASSESSMENT — INTRAOCULAR PRESSURE
OS: 9
OD: 8

## 2021-03-12 DIAGNOSIS — G47.00 INSOMNIA, UNSPECIFIED TYPE: ICD-10-CM

## 2021-03-12 RX ORDER — ZOLPIDEM TARTRATE 10 MG/1
TABLET ORAL
Qty: 90 TABLET | Refills: 0 | Status: SHIPPED | OUTPATIENT
Start: 2021-03-12 | End: 2021-06-22

## 2021-03-12 NOTE — TELEPHONE ENCOUNTER
Routing refill request to provider for review/approval because:  Drug not on the [facility] refill protocol     Inga MORALESN, RN

## 2021-04-05 ENCOUNTER — OFFICE VISIT (OUTPATIENT)
Dept: URBAN - METROPOLITAN AREA CLINIC 24 | Facility: CLINIC | Age: 86
End: 2021-04-05
Payer: MEDICARE

## 2021-04-05 PROCEDURE — 92242 FLUORESCEIN&ICG ANGIOGRAPHY: CPT | Performed by: OPHTHALMOLOGY

## 2021-04-05 PROCEDURE — 99214 OFFICE O/P EST MOD 30 MIN: CPT | Performed by: OPHTHALMOLOGY

## 2021-04-05 PROCEDURE — 92134 CPTRZ OPH DX IMG PST SGM RTA: CPT | Performed by: OPHTHALMOLOGY

## 2021-04-05 PROCEDURE — 67028 INJECTION EYE DRUG: CPT | Performed by: OPHTHALMOLOGY

## 2021-04-05 ASSESSMENT — INTRAOCULAR PRESSURE
OD: 8
OS: 9

## 2021-04-05 NOTE — IMPRESSION/PLAN
Impression: Nonexudative macular degeneration, intermediate dry stage, right eye Plan: No evidence of CNVM on imaging or examination. Will observe for now. Return precautions emphasized. If any distortions or changes in vision, to call immediately. Amsler grid given Smoking cessation (not a smoker), AREDS2 vitamins, sunglasses on bright days

## 2021-04-05 NOTE — IMPRESSION/PLAN
Impression: Exudative macular degeneration, with active choroidal neovascularization, left eye Plan: Active CNVM OS - fluid resolved with [id number] Lucentis OS. Extend to 8 weeks. Lucentis OS given today without complication. R/B/A discussed at length. Review in 8 weeks with retina specialist in [facility]. 

May update refraction

## 2021-06-08 ENCOUNTER — TELEPHONE (OUTPATIENT)
Dept: FAMILY MEDICINE | Facility: CLINIC | Age: 86
End: 2021-06-08

## 2021-06-08 DIAGNOSIS — E78.5 HYPERLIPIDEMIA LDL GOAL <130: ICD-10-CM

## 2021-06-08 RX ORDER — SIMVASTATIN 80 MG
80 TABLET ORAL AT BEDTIME
Qty: 90 TABLET | Refills: 3 | Status: SHIPPED | OUTPATIENT
Start: 2021-06-08

## 2021-06-08 NOTE — TELEPHONE ENCOUNTER
Routing refill request to provider for review/approval because:  Drug not active on patient's medication list    Inga MORALESN, RN

## 2021-06-08 NOTE — TELEPHONE ENCOUNTER
Reason for Call:  Medication or medication refill:    Do you use a [facility]?  Name of the pharmacy and phone number for the current request:  [id number] - [place], [place] - [address]    Name of the medication requested: simvastatin (ZOCOR) 80 MG tablet      Other request:     Can we leave a detailed message on this number? YES    Phone number patient can be reached at: Cell number on file:    Telephone Information:   Mobile [phone]       Best Time: any    Call taken on 6/8/2021 at 8:51 AM by Thelma Bravo

## 2021-06-19 DIAGNOSIS — G47.00 INSOMNIA, UNSPECIFIED TYPE: ICD-10-CM

## 2021-06-22 RX ORDER — ZOLPIDEM TARTRATE 10 MG/1
TABLET ORAL
Qty: 90 TABLET | Refills: 3 | Status: SHIPPED | OUTPATIENT
Start: 2021-06-22 | End: 2022-01-04

## 2021-09-08 ENCOUNTER — OFFICE VISIT (OUTPATIENT)
Dept: FAMILY MEDICINE | Facility: CLINIC | Age: 86
End: 2021-09-08
Payer: MEDICARE

## 2021-09-08 VITALS
HEART RATE: 64 BPM | BODY MASS INDEX: 21.7 KG/M2 | SYSTOLIC BLOOD PRESSURE: 116 MMHG | WEIGHT: 151.6 LBS | OXYGEN SATURATION: 98 % | TEMPERATURE: 96.3 F | HEIGHT: 70 IN | DIASTOLIC BLOOD PRESSURE: 69 MMHG

## 2021-09-08 DIAGNOSIS — D50.9 IRON DEFICIENCY ANEMIA, UNSPECIFIED IRON DEFICIENCY ANEMIA TYPE: ICD-10-CM

## 2021-09-08 DIAGNOSIS — I10 ESSENTIAL HYPERTENSION WITH GOAL BLOOD PRESSURE LESS THAN 140/90: ICD-10-CM

## 2021-09-08 DIAGNOSIS — Z00.00 ENCOUNTER FOR MEDICARE ANNUAL WELLNESS EXAM: Primary | ICD-10-CM

## 2021-09-08 DIAGNOSIS — N40.1 BENIGN NON-NODULAR PROSTATIC HYPERPLASIA WITH LOWER URINARY TRACT SYMPTOMS: ICD-10-CM

## 2021-09-08 DIAGNOSIS — E78.5 HYPERLIPIDEMIA LDL GOAL <130: ICD-10-CM

## 2021-09-08 DIAGNOSIS — Z13.1 SCREENING FOR DIABETES MELLITUS: ICD-10-CM

## 2021-09-08 LAB
ANION GAP SERPL CALCULATED.3IONS-SCNC: 3 MMOL/L (ref 3–14)
BASOPHILS # BLD AUTO: 0 10E3/UL (ref 0–0.2)
BASOPHILS NFR BLD AUTO: 0 %
BUN SERPL-MCNC: 15 MG/DL (ref 7–30)
CALCIUM SERPL-MCNC: 8.6 MG/DL (ref 8.5–10.1)
CHLORIDE BLD-SCNC: 106 MMOL/L (ref 94–109)
CHOLEST SERPL-MCNC: 107 MG/DL
CO2 SERPL-SCNC: 28 MMOL/L (ref 20–32)
CREAT SERPL-MCNC: 0.87 MG/DL (ref 0.66–1.25)
CREAT UR-MCNC: 88 MG/DL
EOSINOPHIL # BLD AUTO: 0.1 10E3/UL (ref 0–0.7)
EOSINOPHIL NFR BLD AUTO: 2 %
ERYTHROCYTE [DISTWIDTH] IN BLOOD BY AUTOMATED COUNT: 14.8 % (ref 10–15)
GFR SERPL CREATININE-BSD FRML MDRD: 78 ML/MIN/1.73M2
GLUCOSE BLD-MCNC: 96 MG/DL (ref 70–99)
HCT VFR BLD AUTO: 32.5 % (ref 40–53)
HDLC SERPL-MCNC: 39 MG/DL
HGB BLD-MCNC: 10.3 G/DL (ref 13.3–17.7)
IMM GRANULOCYTES # BLD: 0 10E3/UL
IMM GRANULOCYTES NFR BLD: 0 %
LDLC SERPL CALC-MCNC: 51 MG/DL
LYMPHOCYTES # BLD AUTO: 1.6 10E3/UL (ref 0.8–5.3)
LYMPHOCYTES NFR BLD AUTO: 28 %
MCH RBC QN AUTO: 30 PG (ref 26.5–33)
MCHC RBC AUTO-ENTMCNC: 31.7 G/DL (ref 31.5–36.5)
MCV RBC AUTO: 95 FL (ref 78–100)
MICROALBUMIN UR-MCNC: 28 MG/L
MICROALBUMIN/CREAT UR: 31.82 MG/G CR (ref 0–17)
MONOCYTES # BLD AUTO: 0.6 10E3/UL (ref 0–1.3)
MONOCYTES NFR BLD AUTO: 11 %
NEUTROPHILS # BLD AUTO: 3.3 10E3/UL (ref 1.6–8.3)
NEUTROPHILS NFR BLD AUTO: 58 %
NONHDLC SERPL-MCNC: 68 MG/DL
PLATELET # BLD AUTO: 228 10E3/UL (ref 150–450)
POTASSIUM BLD-SCNC: 4.2 MMOL/L (ref 3.4–5.3)
RBC # BLD AUTO: 3.43 10E6/UL (ref 4.4–5.9)
SODIUM SERPL-SCNC: 137 MMOL/L (ref 133–144)
TRIGL SERPL-MCNC: 85 MG/DL
WBC # BLD AUTO: 5.7 10E3/UL (ref 4–11)

## 2021-09-08 PROCEDURE — 80061 LIPID PANEL: CPT | Performed by: FAMILY MEDICINE

## 2021-09-08 PROCEDURE — 36415 COLL VENOUS BLD VENIPUNCTURE: CPT | Performed by: FAMILY MEDICINE

## 2021-09-08 PROCEDURE — 82043 UR ALBUMIN QUANTITATIVE: CPT | Performed by: FAMILY MEDICINE

## 2021-09-08 PROCEDURE — G0439 PPPS, SUBSEQ VISIT: HCPCS | Performed by: FAMILY MEDICINE

## 2021-09-08 PROCEDURE — 85025 COMPLETE CBC W/AUTO DIFF WBC: CPT | Performed by: FAMILY MEDICINE

## 2021-09-08 PROCEDURE — 80048 BASIC METABOLIC PNL TOTAL CA: CPT | Performed by: FAMILY MEDICINE

## 2021-09-08 RX ORDER — FERROUS SULFATE 325(65) MG
325 TABLET ORAL 2 TIMES DAILY
Qty: 180 TABLET | Refills: 3 | Status: SHIPPED | OUTPATIENT
Start: 2021-09-08

## 2021-09-08 ASSESSMENT — MIFFLIN-ST. JEOR: SCORE: 1363.9

## 2021-09-08 NOTE — PATIENT INSTRUCTIONS
Patient Education   Personalized Prevention Plan  You are due for the preventive services outlined below.  Your care team is available to assist you in scheduling these services.  If you have already completed any of these items, please share that information with your care team to update in your medical record.  Health Maintenance Due   Topic Date Due     URINE DRUG SCREEN  Never done     ANNUAL REVIEW OF HM ORDERS  Never done     COVID-19 Vaccine (1) Never done     Zoster (Shingles) Vaccine (3 of 3) 09/25/2019     PHQ-2  01/01/2021     Flu Vaccine (1) 09/01/2021     Basic Metabolic Panel  09/02/2021     Kidney Microalbumin Urine Test  09/02/2021     FALL RISK ASSESSMENT  09/02/2021     Annual Wellness Visit  09/02/2021       At [facility], we strive to deliver an exceptional experience to you, every time we see you. If you receive a survey, please complete it as we do value your feedback.  If you have MyChart, you can expect to receive results automatically within 24 hours of their completion.  Your provider will send a note interpreting your results as well.   If you do not have MyChart, you should receive your results in about a week by mail.    Your care team:                            Family Medicine Internal Medicine   MD Rudolph Escobar MD Shantel Branch-Fleming, MD Srinivasa Vaka, MD Katya Belousova, PA-C Megan Hill, APRN CNP Nam Ho, MD Pediatrics   Miko Hare, MITZI Malcolm, MD Francisca Kapoor APRN MD Bree Lo MD Deborah Mielke, MD Kim Thein, APRN [facility]      Clinic hours: Monday - Thursday 7 am-6 pm; Fridays 7 am-5 pm.   Urgent care: Monday - Friday 10 am- 8 pm; Saturday and Sunday 9 am-5 pm.    Clinic: [phone]       [facility]: Monday - Thursday 8 am - 7 pm; Friday 8 am - 6 pm  [facility]: [phone]     Use  www.[email or web address].org for 24/7 diagnosis and treatment of dozens of conditions.

## 2021-09-08 NOTE — LETTER
September 9, 2021      Butch Carter  [address]  [facility] [place] [address]        Dear ,    We are writing to inform you of your test results.    Your kidney, electrolyte, blood sugar and cholesterol tests were normal. Your blood count, hemoglobin was a little low. Please continue with the iron supplements daily. Please follow up in 1 year for routine physical for recheck.       Resulted Orders   Lipid panel reflex to direct LDL Fasting   Result Value Ref Range    Cholesterol 107 <200 mg/dL      Comment:      Age 0-19 years  Desirable: <170 mg/dL  Borderline high:  170-199 mg/dl  High:            >199 mg/dl    Age 20 years and older  Desirable: <200 mg/dL    Triglycerides 85 <150 mg/dL      Comment:      0-9 years:  Normal:    Less than 75 mg/dL  Borderline high:  75-99 mg/dL  High:             Greater than or equal to 100 mg/dL    0-19 years:  Normal:    Less than 90 mg/dL  Borderline high:   mg/dL  High:             Greater than or equal to 130 mg/dL    20 years and older:  Normal:    Less than 150 mg/dL  Borderline high:  150-199 mg/dL  High:             200-499 mg/dL  Very high:   Greater than or equal to 500 mg/dL    Direct Measure HDL 39 (L) >=40 mg/dL      Comment:      0-19 years:       Greater than or equal to 45 mg/dL   Low: Less than 40 mg/dL   Borderline low: 40-44 mg/dL     20 years and older:   Female: Greater than or equal to 50 mg/dL   Male:   Greater than or equal to 40 mg/dL         LDL Cholesterol Calculated 51 <=100 mg/dL      Comment:      Age 0-19 years:  Desirable: 0-110 mg/dL   Borderline high: 110-129 mg/dL   High: >= 130 mg/dL    Age 20 years and older:  Desirable: <100mg/dL  Above desirable: 100-129 mg/dL   Borderline high: 130-159 mg/dL   High: 160-189 mg/dL   Very high: >= 190 mg/dL    Non HDL Cholesterol 68 <130 mg/dL      Comment:      0-19 years:  Desirable:          Less than 120 mg/dL  Borderline high:   120-144 mg/dL  High:                   Greater than or  equal to 145 mg/dL    20 years and older:  Desirable:          130 mg/dL  Above Desirable: 130-159 mg/dL  Borderline high:   160-189 mg/dL  High:               190-219 mg/dL  Very high:     Greater than or equal to 220 mg/dL   Albumin Random Urine Quantitative with Creat Ratio   Result Value Ref Range    Creatinine Urine mg/dL 88 mg/dL    Albumin Urine mg/L 28 mg/L    Albumin Urine mg/g Cr 31.82 (H) 0.00 - 17.00 mg/g Cr   BASIC METABOLIC PANEL   Result Value Ref Range    Sodium 137 133 - 144 mmol/L    Potassium 4.2 3.4 - 5.3 mmol/L    Chloride 106 94 - 109 mmol/L    Carbon Dioxide (CO2) 28 20 - 32 mmol/L    Anion Gap 3 3 - 14 mmol/L    Urea Nitrogen 15 7 - 30 mg/dL    Creatinine 0.87 0.66 - 1.25 mg/dL    Calcium 8.6 8.5 - 10.1 mg/dL    Glucose 96 70 - 99 mg/dL    GFR Estimate 78 >60 mL/min/1.73m2      Comment:      As of July 11, 2021, eGFR is calculated by the CKD-EPI creatinine equation, without race adjustment. eGFR can be influenced by muscle mass, exercise, and diet. The reported eGFR is an estimation only and is only applicable if the renal function is stable.   CBC with platelets and differential   Result Value Ref Range    WBC Count 5.7 4.0 - 11.0 10e3/uL    RBC Count 3.43 (L) 4.40 - 5.90 10e6/uL    Hemoglobin 10.3 (L) 13.3 - 17.7 g/dL    Hematocrit 32.5 (L) 40.0 - 53.0 %    MCV 95 78 - 100 fL    MCH 30.0 26.5 - 33.0 pg    MCHC 31.7 31.5 - 36.5 g/dL    RDW 14.8 10.0 - 15.0 %    Platelet Count 228 150 - 450 10e3/uL    % Neutrophils 58 %    % Lymphocytes 28 %    % Monocytes 11 %    % Eosinophils 2 %    % Basophils 0 %    % Immature Granulocytes 0 %    Absolute Neutrophils 3.3 1.6 - 8.3 10e3/uL    Absolute Lymphocytes 1.6 0.8 - 5.3 10e3/uL    Absolute Monocytes 0.6 0.0 - 1.3 10e3/uL    Absolute Eosinophils 0.1 0.0 - 0.7 10e3/uL    Absolute Basophils 0.0 0.0 - 0.2 10e3/uL    Absolute Immature Granulocytes 0.0 <=0.0 10e3/uL       If you have any questions or concerns, please call the clinic at the number listed  above.       Sincerely,      Tate Hsu MD

## 2021-09-08 NOTE — PROGRESS NOTES
"  SUBJECTIVE:   Butch Carter is a 87 year old male who presents for Preventive Visit.      Patient has been advised of split billing requirements and indicates understanding: Yes  Are you in the first 12 months of your Medicare Part B coverage?  No    Physical Health:    In general, how would you rate your overall physical health? good    Outside of work, how many days during the week do you exercise? 2-3 days/week    Outside of work, approximately how many minutes a day do you exercise?15-30 minutes    If you drink alcohol do you typically have >3 drinks per day or >7 drinks per week? No    Do you usually eat at least 4 servings of fruit and vegetables a day, include whole grains & fiber and avoid regularly eating high fat or \"junk\" foods? Yes    Do you have any problems taking medications regularly?  No    Do you have any side effects from medications? none    Needs assistance for the following daily activities: no assistance needed    Which of the following safety concerns are present in your home?  none identified     Hearing impairment: No    In the past 6 months, have you been bothered by leaking of urine? no    Mental Health:    In general, how would you rate your overall mental or emotional health? good  PHQ-2 Score:      Do you feel safe in your environment? Yes    Have you ever done Advance Care Planning? (For example, a Health Directive, POLST, or a discussion with a medical provider or your loved ones about your wishes): No, advance care planning information given to patient to review.  Patient plans to discuss their wishes with loved ones or provider.      Additional concerns to address?  No    Fall risk:     Cognitive Screenin) Repeat 3 items (Leader, Season, Table)    2) Clock draw: NORMAL  3) 3 item recall: Recalls 2 objects   Results: NORMAL clock, 1-2 items recalled: COGNITIVE IMPAIRMENT LESS LIKELY    Mini-CogTM Copyright S Eliezer. Licensed by the author for use in [facility]; " reprinted with permission (soob@.[email or web address]). All rights reserved.        Reviewed and updated as needed this visit by clinical staff     Problems             Reviewed and updated as needed this visit by Provider                Social History     Tobacco Use     Smoking status: Never Smoker     Smokeless tobacco: Never Used   Substance Use Topics     Alcohol use: No                           Current providers sharing in care for this patient include:   Patient Care Team:  Tate Hsu MD as PCP - General (Family Practice)  Ttae Hsu MD as Assigned PCP    The following health maintenance items are reviewed in Epic and correct as of today:  Health Maintenance   Topic Date Due     URINE DRUG SCREEN  Never done     ANNUAL REVIEW OF HM ORDERS  Never done     COVID-19 Vaccine (1) Never done     ZOSTER IMMUNIZATION (3 of 3) 09/25/2019     PHQ-2  01/01/2021     INFLUENZA VACCINE (1) 09/01/2021     BMP  09/02/2021     MICROALBUMIN  09/02/2021     FALL RISK ASSESSMENT  09/02/2021     MEDICARE ANNUAL WELLNESS VISIT  09/02/2021     DTAP/TDAP/TD IMMUNIZATION (5 - Td or Tdap) 09/25/2022     ADVANCE CARE PLANNING  09/02/2025     Pneumococcal Vaccine: 65+ Years  Completed     IPV IMMUNIZATION  Aged Out     MENINGITIS IMMUNIZATION  Aged Out     HEPATITIS B IMMUNIZATION  Aged Out     Lab work is in process  Labs reviewed in EPIC  BP Readings from Last 3 Encounters:   09/08/21 116/69   09/02/20 119/67   07/31/19 123/66    Wt Readings from Last 3 Encounters:   09/08/21 68.8 kg (151 lb 9.6 oz)   09/02/20 67.1 kg (148 lb)   07/31/19 72.6 kg (160 lb)                  Patient Active Problem List   Diagnosis     Hyperlipidemia LDL goal <130     Osteoporosis     Advanced directives, counseling/discussion     ED (erectile dysfunction)     Benign non-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     Iron deficiency anemia     Insomnia, unspecified type     Essential hypertension with goal blood pressure less than 140/90      Erectile dysfunction, unspecified erectile dysfunction type     Past Surgical History:   Procedure Laterality Date     HERNIORRHAPHY VENTRAL N/A 8/2/2017    Procedure: HERNIORRHAPHY VENTRAL;  VENTRAL HERNIA REPAIR WITH MESH;  Surgeon: Hari Durham MD;  Location: [facility]     SURGICAL HISTORY OF -   5/04    Double Hernia     SURGICAL HISTORY OF -   2013    cataract OD       Social History     Tobacco Use     Smoking status: Never Smoker     Smokeless tobacco: Never Used   Substance Use Topics     Alcohol use: No     Family History   Problem Relation Age of Onset     Cancer Mother      Hypertension Mother          Current Outpatient Medications   Medication Sig Dispense Refill     ferrous sulfate (FEROSUL) 325 (65 Fe) MG tablet Take 1 tablet (325 mg) by mouth 2 times daily 180 tablet 3     aspirin 81 MG tablet Take 1 tablet (81 mg) by mouth daily * 100 tablet 1     calcium carbonate (OS-SUSHIL 500 MG [place]. CA) 1250 MG tablet Take 1 tablet by mouth 2 times daily       Cyanocobalamin (B-12 PO)        Glucosamine-Chondroit-Vit C-Mn (GLUCOSAMINE CHONDR 1500 COMPLX PO)        HYDROcodone-acetaminophen (NORCO) 5-325 MG per tablet Take 1-2 tablets by mouth every 4 hours as needed for other (Moderate to Severe Pain) 30 tablet 0     ibuprofen (ADVIL/MOTRIN) 600 MG tablet Take 1 tablet (600 mg) by mouth every 8 hours as needed for pain (mild) 30 tablet 0     Multiple Vitamins-Minerals (CENTRUM SILVER ADULT 50+ PO)        Omega-3 Fatty Acids (FISH OIL) 1000 MG CPDR Take by mouth 2 times daily       simvastatin (ZOCOR) 80 MG tablet Take 1 tablet (80 mg) by mouth At Bedtime 90 tablet 3     VITAMIN D, CHOLECALCIFEROL, PO Take by mouth daily       zolpidem (AMBIEN) 10 MG tablet TAKE ONE TABLET BY MOUTH ONCE DAILY IN THE EVENING AS NEEDED FOR SLEEP 90 tablet 3     No Known Allergies    ROS:  Constitutional, HEENT, cardiovascular, pulmonary, GI, , musculoskeletal, neuro, skin, endocrine and psych systems are negative, except as  "otherwise noted.    OBJECTIVE:   /69 (BP Location: Left arm, Patient Position: Sitting, Cuff Size: Adult Regular)   Pulse 64   Temp (!) 96.3  F (35.7  C) (Tympanic)   Ht 1.77 m (5' 9.69\")   Wt 68.8 kg (151 lb 9.6 oz)   SpO2 98%   BMI 21.95 kg/m   Estimated body mass index is 21.95 kg/m  as calculated from the following:    Height as of this encounter: 1.77 m (5' 9.69\").    Weight as of this encounter: 68.8 kg (151 lb 9.6 oz).  EXAM:   GENERAL: healthy, alert and no distress  NECK: no adenopathy, no asymmetry, masses, or scars and thyroid normal to palpation  RESP: lungs clear to auscultation - no rales, rhonchi or wheezes  CV: regular rate and rhythm, normal S1 S2, no S3 or S4, no murmur, click or rub, no peripheral edema and peripheral pulses strong  ABDOMEN: soft, nontender, no hepatosplenomegaly, no masses and bowel sounds normal  MS: no gross musculoskeletal defects noted, no edema      ASSESSMENT / PLAN:   (Z00.00) Encounter for Medicare annual wellness exam  (primary encounter diagnosis)  Comment:   Plan: as below.    (N40.1) Benign non-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  Comment:   Plan: stable.    (E78.5) Hyperlipidemia LDL goal <130  Comment:   Plan: Lipid panel reflex to direct LDL Fasting        Recheck. Patient has not been taking simvastatin.    (D50.9) Iron deficiency anemia, unspecified iron deficiency anemia type  Comment:   Plan: ferrous sulfate (FEROSUL) 325 (65 Fe) MG         tablet, CBC with platelets and differential        Recheck.    (Z13.1) Screening for diabetes mellitus  Comment:   Plan: BASIC METABOLIC PANEL            (I10) Essential hypertension with goal blood pressure less than 140/90  Comment:   Plan: BASIC METABOLIC PANEL, Albumin Random Urine         Quantitative with Creat Ratio        Controlled by diet.      Patient has been advised of split billing requirements and indicates understanding: Yes    COUNSELING:  Reviewed preventive health " "counseling, as reflected in patient instructions       Regular exercise       Healthy diet/nutrition       Vision screening    Estimated body mass index is 21.95 kg/m  as calculated from the following:    Height as of this encounter: 1.77 m (5' 9.69\").    Weight as of this encounter: 68.8 kg (151 lb 9.6 oz).        He reports that he has never smoked. He has never used smokeless tobacco.    Appropriate preventive services were discussed with this patient, including applicable screening as appropriate for cardiovascular disease, diabetes, osteopenia/osteoporosis, and glaucoma.  As appropriate for age/gender, discussed screening for colorectal cancer, prostate cancer, breast cancer, and cervical cancer. Checklist reviewing preventive services available has been given to the patient.    Reviewed patients plan of care and provided an AVS. The Basic Care Plan (routine screening as documented in Health Maintenance) for Butch meets the Care Plan requirement. This Care Plan has been established and reviewed with the Patient.    Counseling Resources:  ATP IV Guidelines  Pooled Cohorts Equation Calculator  Breast Cancer Risk Calculator  BRCA-Related Cancer Risk Assessment: FHS-7 Tool  FRAX Risk Assessment  ICSI Preventive Guidelines  Dietary Guidelines for Americans, 2010  USDA's MyPlate  ASA Prophylaxis  Lung CA Screening    Tate Hsu MD, MD  [facility]  "

## 2021-11-01 ENCOUNTER — OFFICE VISIT (OUTPATIENT)
Dept: URBAN - METROPOLITAN AREA CLINIC 24 | Facility: CLINIC | Age: 86
End: 2021-11-01
Payer: MEDICARE

## 2021-11-01 PROCEDURE — 92134 CPTRZ OPH DX IMG PST SGM RTA: CPT | Performed by: OPHTHALMOLOGY

## 2021-11-01 PROCEDURE — 99214 OFFICE O/P EST MOD 30 MIN: CPT | Performed by: OPHTHALMOLOGY

## 2021-11-01 ASSESSMENT — INTRAOCULAR PRESSURE
OD: 12
OS: 13

## 2021-11-01 NOTE — IMPRESSION/PLAN
Impression: Exudative macular degeneration, with active choroidal neovascularization, bilateral: H35.3235. Plan: Active CNVM OU - receiving q2yuervi Lucentis OD, continue y6bortki Lucentis OS Lucentis 1/3 OU administered today without complication. R/B/A discussed at length.  Follow-up in 4 weeks for Lucentis 2/3 OD

## 2021-11-29 ENCOUNTER — OFFICE VISIT (OUTPATIENT)
Dept: URBAN - METROPOLITAN AREA CLINIC 24 | Facility: CLINIC | Age: 86
End: 2021-11-29
Payer: MEDICARE

## 2021-11-29 PROCEDURE — 92014 COMPRE OPH EXAM EST PT 1/>: CPT | Performed by: OPHTHALMOLOGY

## 2021-11-29 PROCEDURE — 92134 CPTRZ OPH DX IMG PST SGM RTA: CPT | Performed by: OPHTHALMOLOGY

## 2021-11-29 PROCEDURE — 67028 INJECTION EYE DRUG: CPT | Performed by: OPHTHALMOLOGY

## 2021-11-29 ASSESSMENT — INTRAOCULAR PRESSURE
OD: 7
OS: 6

## 2021-11-29 NOTE — IMPRESSION/PLAN
Impression: Exudative macular degeneration, with active choroidal neovascularization, bilateral: H35.3231. Plan: Active CNVM OU - receiving r0xrcfaj Lucentis OD, continue z1qxxlkb Lucentis OS Lucentis 2/3 OD administered today without complication. R/B/A discussed at length. Follow-up in 4 weeks for Lucentis 3/3 OU.

## 2021-12-31 DIAGNOSIS — G47.00 INSOMNIA, UNSPECIFIED TYPE: ICD-10-CM

## 2022-01-03 NOTE — TELEPHONE ENCOUNTER
Patient and spouse called to check on status of refill request.  Patient and spouse are currently in [place] for the winter, needs a new supply of Zolpidem sent to local [place] pharmacy. Can not transfer any refills from local [facility] here in [place] (as he would have refills on file at this location).    Out of medication completely. Would appreciate refill ASAP as he has been borrowing tablets from spouse (who also has prescription for Zolpidem). Writer encouraged patient to not use spouse's pills and that message would be sent on to PCP right away.  Patient voiced understanding. Are aware PCP is not in office today and may not be addressed till tomorrow, when PCP returns to clinic.      Ladi Ramesh RN  [facility]      
Routing refill request to provider for review/approval because:  Drug not on the [facility] refill protocol       Elinor Babin RN  [facility]          
(0) Answers both questions correctly

## 2022-01-04 RX ORDER — ZOLPIDEM TARTRATE 10 MG/1
TABLET ORAL
Qty: 90 TABLET | Refills: 0 | Status: SHIPPED | OUTPATIENT
Start: 2022-01-04 | End: 2022-03-15

## 2022-01-10 ENCOUNTER — OFFICE VISIT (OUTPATIENT)
Dept: URBAN - METROPOLITAN AREA CLINIC 24 | Facility: CLINIC | Age: 87
End: 2022-01-10
Payer: MEDICARE

## 2022-01-10 PROCEDURE — 92134 CPTRZ OPH DX IMG PST SGM RTA: CPT | Performed by: OPHTHALMOLOGY

## 2022-01-10 ASSESSMENT — INTRAOCULAR PRESSURE
OD: 12
OS: 13

## 2022-01-10 NOTE — IMPRESSION/PLAN
Impression: Exudative macular degeneration, with active choroidal neovascularization, bilateral: H35.3231. Plan: Active CNVM OU - receiving o2jdtuvy Lucentis OD, continue p1opylpf Lucentis OS Lucentis 3/3 OU administered today without complication. R/B/A discussed at length.  Follow-up in 4 weeks for exam, possible FA/ICG (to consider extension OD)

## 2022-02-15 ENCOUNTER — OFFICE VISIT (OUTPATIENT)
Dept: URBAN - METROPOLITAN AREA CLINIC 24 | Facility: CLINIC | Age: 87
End: 2022-02-15
Payer: MEDICARE

## 2022-02-15 DIAGNOSIS — H35.3231 EXUDATIVE MACULAR DEGENERATION, WITH ACTIVE CHOROIDAL NEOVASCULARIZATION, BILATERAL: Primary | ICD-10-CM

## 2022-02-15 PROCEDURE — 92134 CPTRZ OPH DX IMG PST SGM RTA: CPT | Performed by: OPHTHALMOLOGY

## 2022-02-15 PROCEDURE — 99214 OFFICE O/P EST MOD 30 MIN: CPT | Performed by: OPHTHALMOLOGY

## 2022-02-15 PROCEDURE — 92242 FLUORESCEIN&ICG ANGIOGRAPHY: CPT | Performed by: OPHTHALMOLOGY

## 2022-02-15 ASSESSMENT — INTRAOCULAR PRESSURE
OD: 11
OS: 9

## 2022-02-15 NOTE — IMPRESSION/PLAN
Impression: Exudative macular degeneration, with active choroidal neovascularization, bilateral: H35.3231. Plan: Active CNVM OU - fluid resolved OD now, extend OU to 5-6 weeks Lucentis 1/3 OU administered today without complication. R/B/A discussed at length.  Follow-up in 4-5 week for Lucentis 2/3 OU

## 2022-03-14 DIAGNOSIS — G47.00 INSOMNIA, UNSPECIFIED TYPE: ICD-10-CM

## 2022-03-15 RX ORDER — ZOLPIDEM TARTRATE 10 MG/1
TABLET ORAL
Qty: 90 TABLET | Refills: 0 | Status: SHIPPED | OUTPATIENT
Start: 2022-03-15 | End: 2022-05-18

## 2022-03-21 ENCOUNTER — OFFICE VISIT (OUTPATIENT)
Dept: URBAN - METROPOLITAN AREA CLINIC 24 | Facility: CLINIC | Age: 87
End: 2022-03-21
Payer: MEDICARE

## 2022-03-21 PROCEDURE — 92134 CPTRZ OPH DX IMG PST SGM RTA: CPT | Performed by: OPHTHALMOLOGY

## 2022-03-21 ASSESSMENT — INTRAOCULAR PRESSURE
OS: 12
OD: 13

## 2022-03-21 NOTE — IMPRESSION/PLAN
Impression: Exudative macular degeneration, with active choroidal neovascularization, bilateral: H35.3231. Plan: Active CNVM OU - fluid resolved OD now, extend OU to 5-6 weeks Lucentis 2/3 OU administered today without complication. R/B/A discussed at length.  Follow-up in 4-5 week for Lucentis 3/3 OU

## 2022-04-26 ENCOUNTER — PROCEDURE (OUTPATIENT)
Dept: URBAN - METROPOLITAN AREA CLINIC 24 | Facility: CLINIC | Age: 87
End: 2022-04-26
Payer: MEDICARE

## 2022-04-26 DIAGNOSIS — H35.3231 EXUDATIVE MACULAR DEGENERATION, WITH ACTIVE CHOROIDAL NEOVASCULARIZATION, BILATERAL: Primary | ICD-10-CM

## 2022-04-26 PROCEDURE — 92134 CPTRZ OPH DX IMG PST SGM RTA: CPT | Performed by: OPHTHALMOLOGY

## 2022-04-26 ASSESSMENT — INTRAOCULAR PRESSURE
OD: 10
OS: 11

## 2022-04-26 NOTE — IMPRESSION/PLAN
Impression: Exudative macular degeneration, with active choroidal neovascularization, bilateral: H35.3231. Plan: Active CNVM OU - fluid resolved, extend to 6 weeks Lucentis OU Lucentis OU administered today without complication. R/B/A discussed at length.  Follow-up in 6 weeks with retina specialist in [place]

## 2022-05-18 DIAGNOSIS — G47.00 INSOMNIA, UNSPECIFIED TYPE: ICD-10-CM

## 2022-05-18 RX ORDER — ZOLPIDEM TARTRATE 10 MG/1
TABLET ORAL
Qty: 90 TABLET | Refills: 0 | Status: SHIPPED | OUTPATIENT
Start: 2022-05-18 | End: 2022-09-20

## 2022-05-18 NOTE — TELEPHONE ENCOUNTER
Routing refill request to provider for review/approval because:  Drug not on the [facility], [facility] or [facility] refill protocol or controlled substance

## 2022-05-18 NOTE — TELEPHONE ENCOUNTER
Reason for Call:  Medication or medication refill:    Do you use a [facility]?  Name of the pharmacy and phone number for the current request:  [facility] [place] - [phone]    Name of the medication requested: zolpidem (AMBIEN) 10 MG tablet    Other request:Patient asked about a medication being sent to [facility], provider Thein said they would do so but to send a message to remind her.       Can we leave a detailed message on this number? YES    Phone number patient can be reached at: Home number on file [phone] (home) or Cell number on file:    Telephone Information:   Mobile [phone]       Best Time: N/a    Call taken on 5/18/2022 at 8:54 AM by Margarito Aceves

## 2022-09-20 DIAGNOSIS — G47.00 INSOMNIA, UNSPECIFIED TYPE: ICD-10-CM

## 2022-09-20 RX ORDER — ZOLPIDEM TARTRATE 10 MG/1
TABLET ORAL
Qty: 90 TABLET | Refills: 0 | Status: SHIPPED | OUTPATIENT
Start: 2022-09-20 | End: 2022-12-20

## 2022-11-14 ENCOUNTER — OFFICE VISIT (OUTPATIENT)
Dept: URBAN - METROPOLITAN AREA CLINIC 24 | Facility: CLINIC | Age: 87
End: 2022-11-14
Payer: MEDICARE

## 2022-11-14 DIAGNOSIS — H35.3231 EXUDATIVE AGE-RELATED MACULAR DEGENERATION, BILATERAL, WITH ACTIVE CHOROIDAL NEOVASCULARIZATION: Primary | ICD-10-CM

## 2022-11-14 PROCEDURE — 99214 OFFICE O/P EST MOD 30 MIN: CPT | Performed by: OPHTHALMOLOGY

## 2022-11-14 PROCEDURE — 92134 CPTRZ OPH DX IMG PST SGM RTA: CPT | Performed by: OPHTHALMOLOGY

## 2022-11-14 ASSESSMENT — INTRAOCULAR PRESSURE
OS: 13
OD: 12

## 2022-11-14 NOTE — IMPRESSION/PLAN
Impression: Exudative macular degeneration, with active choroidal neovascularization, bilateral: H35.3231. Plan: Active CNVM OU - fluid resolved, extend to 6 weeks Lucentis OU (to consider extend to 8 weeks Lucentis OU in the near future) Lucentis 1/3 OU administered today without complication. R/B/A discussed at length.  Follow-up in 6 weeks for Lucentis 2/3 OU

## 2022-12-20 DIAGNOSIS — G47.00 INSOMNIA, UNSPECIFIED TYPE: ICD-10-CM

## 2022-12-20 RX ORDER — ZOLPIDEM TARTRATE 10 MG/1
TABLET ORAL
Qty: 90 TABLET | Refills: 0 | Status: SHIPPED | OUTPATIENT
Start: 2022-12-20 | End: 2023-03-22

## 2023-01-09 ENCOUNTER — OFFICE VISIT (OUTPATIENT)
Dept: URBAN - METROPOLITAN AREA CLINIC 24 | Facility: CLINIC | Age: 88
End: 2023-01-09
Payer: MEDICARE

## 2023-01-09 DIAGNOSIS — H35.3231 EXUDATIVE AGE-RELATED MACULAR DEGENERATION, BILATERAL, WITH ACTIVE CHOROIDAL NEOVASCULARIZATION: Primary | ICD-10-CM

## 2023-01-09 PROCEDURE — 92134 CPTRZ OPH DX IMG PST SGM RTA: CPT | Performed by: OPHTHALMOLOGY

## 2023-01-09 ASSESSMENT — INTRAOCULAR PRESSURE
OS: 13
OD: 13

## 2023-01-09 NOTE — IMPRESSION/PLAN
Impression: Exudative macular degeneration, with active choroidal neovascularization, bilateral: H35.3231. Plan: Active CNVM OU - fluid resolved, extend to 7 weeks Lucentis OU Lucentis 2/3 OU administered today without complication. R/B/A discussed at length.  Follow-up in 7 weeks for Lucentis 3/3 OU

## 2023-02-28 ENCOUNTER — OFFICE VISIT (OUTPATIENT)
Dept: URBAN - METROPOLITAN AREA CLINIC 24 | Facility: CLINIC | Age: 88
End: 2023-02-28
Payer: MEDICARE

## 2023-02-28 DIAGNOSIS — H35.3231 EXUDATIVE AGE-RELATED MACULAR DEGENERATION, BILATERAL, WITH ACTIVE CHOROIDAL NEOVASCULARIZATION: Primary | ICD-10-CM

## 2023-02-28 PROCEDURE — 99214 OFFICE O/P EST MOD 30 MIN: CPT | Performed by: OPHTHALMOLOGY

## 2023-02-28 PROCEDURE — 92134 CPTRZ OPH DX IMG PST SGM RTA: CPT | Performed by: OPHTHALMOLOGY

## 2023-02-28 ASSESSMENT — INTRAOCULAR PRESSURE
OD: 16
OS: 15

## 2023-02-28 NOTE — IMPRESSION/PLAN
Impression: Exudative macular degeneration, with active choroidal neovascularization, bilateral: H35.3231. Plan: Active CNVM OU - fluid recurred OD with 7 week extend, continue x9lnueya Lucentis OU Lucentis 3/4 OU administered today without complication. R/B/A discussed at length.  Follow-up in 6 weeks for Lucentis 4/4 OU

## 2023-03-22 DIAGNOSIS — G47.00 INSOMNIA, UNSPECIFIED TYPE: ICD-10-CM

## 2023-03-22 RX ORDER — ZOLPIDEM TARTRATE 10 MG/1
TABLET ORAL
Qty: 90 TABLET | Refills: 0 | Status: SHIPPED | OUTPATIENT
Start: 2023-03-22 | End: 2023-07-06

## 2023-04-11 ENCOUNTER — PROCEDURE (OUTPATIENT)
Dept: URBAN - METROPOLITAN AREA CLINIC 24 | Facility: CLINIC | Age: 88
End: 2023-04-11
Payer: MEDICARE

## 2023-04-11 DIAGNOSIS — H35.3231 EXUDATIVE AGE-RELATED MACULAR DEGENERATION, BILATERAL, WITH ACTIVE CHOROIDAL NEOVASCULARIZATION: Primary | ICD-10-CM

## 2023-04-11 PROCEDURE — 92134 CPTRZ OPH DX IMG PST SGM RTA: CPT | Performed by: OPHTHALMOLOGY

## 2023-04-11 ASSESSMENT — INTRAOCULAR PRESSURE
OD: 9
OS: 7

## 2023-04-11 NOTE — IMPRESSION/PLAN
Impression: Exudative macular degeneration, with active choroidal neovascularization, bilateral: H35.3231. Plan: Active CNVM OU - fluid recurred OD with 7 week extend, continue o9xroydi Lucentis OU Lucentis 4/4 OU administered today without complication. R/B/A discussed at length.  Follow-up in 6 weeks for a dilated exam.

## 2023-05-23 ENCOUNTER — OFFICE VISIT (OUTPATIENT)
Dept: URBAN - METROPOLITAN AREA CLINIC 24 | Facility: CLINIC | Age: 88
End: 2023-05-23
Payer: MEDICARE

## 2023-05-23 DIAGNOSIS — H35.3231 EXUDATIVE AGE-RELATED MACULAR DEGENERATION, BILATERAL, WITH ACTIVE CHOROIDAL NEOVASCULARIZATION: Primary | ICD-10-CM

## 2023-05-23 PROCEDURE — 99214 OFFICE O/P EST MOD 30 MIN: CPT | Performed by: OPHTHALMOLOGY

## 2023-05-23 PROCEDURE — 92134 CPTRZ OPH DX IMG PST SGM RTA: CPT | Performed by: OPHTHALMOLOGY

## 2023-05-23 ASSESSMENT — INTRAOCULAR PRESSURE
OS: 10
OD: 6

## 2023-05-23 NOTE — IMPRESSION/PLAN
Impression: Exudative macular degeneration, with active choroidal neovascularization, bilateral: H35.3231. Plan: Active CNVM OU - fluid recurred OD with 7 week extend, continue g3siigxt Lucentis OU Lucentis 1/3 OU administered today without complication. R/B/A discussed at length.  Follow-up in 6 weeks for Lucentis 2/3 OU

## 2023-07-06 ENCOUNTER — TELEPHONE (OUTPATIENT)
Dept: FAMILY MEDICINE | Facility: CLINIC | Age: 88
End: 2023-07-06
Payer: MEDICARE

## 2023-07-06 DIAGNOSIS — G47.00 INSOMNIA, UNSPECIFIED TYPE: ICD-10-CM

## 2023-07-06 RX ORDER — ZOLPIDEM TARTRATE 10 MG/1
TABLET ORAL
Qty: 90 TABLET | Refills: 1 | Status: SHIPPED | OUTPATIENT
Start: 2023-07-06

## 2023-07-06 NOTE — TELEPHONE ENCOUNTER
Plan does not cover zolpidem (AMBIEN) 10 MG tablet. Please call [phone] to initiate PA or switch to alternative medication.    Patient ID#: [id number]

## 2023-07-06 NOTE — TELEPHONE ENCOUNTER
Medication Question or Refill    Contacts       Type Contact Phone/Fax    07/06/2023 10:28 AM CDT Phone (Incoming) Butch Carter (Self) [phone] (H)          What medication are you calling about (include dose and sig)?: zolpidem (AMBIEN) 10 MG tablet    Preferred Pharmacy:      [id number] - [place], [place] - [address]  [address] [address]  Phone: [phone] Fax: [phone]      Controlled Substance Agreement on file:   CSA -- Patient Level:    CSA: None found at the patient level.       Who prescribed the medication?: Dr Tate Hsu    Do you need a refill? Yes    Patient offered an appointment? No    Do you have any questions or concerns?  No      Okay to leave a detailed message?: Yes at Home number on file [phone] (home)  [facility]   Primary Care

## 2023-07-10 ENCOUNTER — OFFICE VISIT (OUTPATIENT)
Dept: URBAN - METROPOLITAN AREA CLINIC 24 | Facility: CLINIC | Age: 88
End: 2023-07-10
Payer: MEDICARE

## 2023-07-10 DIAGNOSIS — H35.3231 EXUDATIVE AGE-RELATED MACULAR DEGENERATION, BILATERAL, WITH ACTIVE CHOROIDAL NEOVASCULARIZATION: Primary | ICD-10-CM

## 2023-07-10 PROCEDURE — 92134 CPTRZ OPH DX IMG PST SGM RTA: CPT | Performed by: OPHTHALMOLOGY

## 2023-07-10 ASSESSMENT — INTRAOCULAR PRESSURE
OD: 12
OS: 14

## 2023-07-10 NOTE — IMPRESSION/PLAN
Impression: Exudative macular degeneration, with active choroidal neovascularization, bilateral: H35.3231. Plan: Active CNVM OU - fluid recurred OD with 7 week extend, continue [id number] Lucentis OU Lucentis 2/3 OU administered today without complication. R/B/A discussed at length.  Follow-up in 6 weeks for Lucentis 3/3 OU

## 2023-07-11 NOTE — TELEPHONE ENCOUNTER
Central Prior Authorization Team   Phone: [phone]    PA Initiation    Medication: zolpidem (AMBIEN) 10 MG tablet  Insurance Company: [employer] - Phone [phone] Fax [phone]  Pharmacy Filling the Rx: [id number] - [place], [place] - [address] AT [address]  Filling Pharmacy Phone: [phone]  Filling Pharmacy Fax:    Start Date: 7/11/2023

## 2023-07-12 NOTE — TELEPHONE ENCOUNTER
Prior Authorization Approval    Authorization Effective Date: 6/27/2023  Authorization Expiration Date: 12/31/2099  Medication: zolpidem (AMBIEN) 10 MG tablet  Approved Dose/Quantity:    Reference #:     Insurance Company: [employer] - [employer] [phone] Fax [phone]  Expected CoPay:       CoPay Card Available:      Foundation Assistance Needed:    Which Pharmacy is filling the prescription (Not needed for infusion/clinic administered): [id number] - [place], [place] - [address] AT [place]  Pharmacy Notified: Yes  Patient Notified:  Pharmacy will notify patient

## 2023-08-21 ENCOUNTER — OFFICE VISIT (OUTPATIENT)
Dept: URBAN - METROPOLITAN AREA CLINIC 24 | Facility: CLINIC | Age: 88
End: 2023-08-21
Payer: MEDICARE

## 2023-08-21 DIAGNOSIS — H35.3231 EXUDATIVE AGE-RELATED MACULAR DEGENERATION, BILATERAL, WITH ACTIVE CHOROIDAL NEOVASCULARIZATION: Primary | ICD-10-CM

## 2023-08-21 PROCEDURE — 92134 CPTRZ OPH DX IMG PST SGM RTA: CPT | Performed by: OPHTHALMOLOGY

## 2023-08-21 ASSESSMENT — INTRAOCULAR PRESSURE
OD: 12
OS: 14

## 2023-10-02 ENCOUNTER — OFFICE VISIT (OUTPATIENT)
Dept: URBAN - METROPOLITAN AREA CLINIC 24 | Facility: CLINIC | Age: 88
End: 2023-10-02
Payer: MEDICARE

## 2023-10-02 DIAGNOSIS — H35.3231 EXUDATIVE AGE-RELATED MACULAR DEGENERATION, BILATERAL, WITH ACTIVE CHOROIDAL NEOVASCULARIZATION: Primary | ICD-10-CM

## 2023-10-02 PROCEDURE — 99214 OFFICE O/P EST MOD 30 MIN: CPT | Performed by: OPHTHALMOLOGY

## 2023-10-02 PROCEDURE — 92134 CPTRZ OPH DX IMG PST SGM RTA: CPT | Performed by: OPHTHALMOLOGY

## 2023-10-02 ASSESSMENT — INTRAOCULAR PRESSURE
OD: 14
OS: 16

## 2023-11-13 ENCOUNTER — OFFICE VISIT (OUTPATIENT)
Dept: URBAN - METROPOLITAN AREA CLINIC 24 | Facility: CLINIC | Age: 88
End: 2023-11-13
Payer: MEDICARE

## 2023-11-13 PROCEDURE — 92134 CPTRZ OPH DX IMG PST SGM RTA: CPT | Performed by: OPHTHALMOLOGY

## 2023-11-13 ASSESSMENT — INTRAOCULAR PRESSURE
OD: 8
OS: 10

## 2023-12-11 ENCOUNTER — OFFICE VISIT (OUTPATIENT)
Dept: URBAN - METROPOLITAN AREA CLINIC 24 | Facility: CLINIC | Age: 88
End: 2023-12-11
Payer: MEDICARE

## 2023-12-11 PROCEDURE — 92134 CPTRZ OPH DX IMG PST SGM RTA: CPT | Performed by: OPHTHALMOLOGY

## 2023-12-11 ASSESSMENT — INTRAOCULAR PRESSURE
OD: 13
OS: 12

## 2024-01-16 ENCOUNTER — OFFICE VISIT (OUTPATIENT)
Dept: URBAN - METROPOLITAN AREA CLINIC 24 | Facility: CLINIC | Age: 89
End: 2024-01-16
Payer: MEDICARE

## 2024-01-16 DIAGNOSIS — H35.3231 EXUDATIVE AGE-RELATED MACULAR DEGENERATION, BILATERAL, WITH ACTIVE CHOROIDAL NEOVASCULARIZATION: Primary | ICD-10-CM

## 2024-01-16 PROCEDURE — 99214 OFFICE O/P EST MOD 30 MIN: CPT | Performed by: OPHTHALMOLOGY

## 2024-01-16 PROCEDURE — 92134 CPTRZ OPH DX IMG PST SGM RTA: CPT | Performed by: OPHTHALMOLOGY

## 2024-01-16 ASSESSMENT — INTRAOCULAR PRESSURE
OD: 10
OS: 12

## 2024-02-27 ENCOUNTER — OFFICE VISIT (OUTPATIENT)
Dept: URBAN - METROPOLITAN AREA CLINIC 24 | Facility: CLINIC | Age: 89
End: 2024-02-27
Payer: MEDICARE

## 2024-02-27 DIAGNOSIS — H35.3231 EXUDATIVE AGE-RELATED MACULAR DEGENERATION, BILATERAL, WITH ACTIVE CHOROIDAL NEOVASCULARIZATION: Primary | ICD-10-CM

## 2024-02-27 ASSESSMENT — INTRAOCULAR PRESSURE
OS: 9
OD: 9

## 2024-04-09 ENCOUNTER — OFFICE VISIT (OUTPATIENT)
Dept: URBAN - METROPOLITAN AREA CLINIC 24 | Facility: CLINIC | Age: 89
End: 2024-04-09
Payer: MEDICARE

## 2024-04-09 DIAGNOSIS — H35.3231 EXUDATIVE AGE-RELATED MACULAR DEGENERATION, BILATERAL, WITH ACTIVE CHOROIDAL NEOVASCULARIZATION: Primary | ICD-10-CM

## 2024-04-09 PROCEDURE — 92134 CPTRZ OPH DX IMG PST SGM RTA: CPT | Performed by: OPHTHALMOLOGY

## 2024-04-09 ASSESSMENT — INTRAOCULAR PRESSURE
OD: 9
OS: 8

## 2024-09-04 ENCOUNTER — TRANSFERRED RECORDS (OUTPATIENT)
Dept: HEALTH INFORMATION MANAGEMENT | Facility: CLINIC | Age: 89
End: 2024-09-04
Payer: MEDICARE

## 2024-10-22 ENCOUNTER — OFFICE VISIT (OUTPATIENT)
Dept: URBAN - METROPOLITAN AREA CLINIC 24 | Facility: CLINIC | Age: 89
End: 2024-10-22
Payer: MEDICARE

## 2024-10-22 DIAGNOSIS — H35.3231 EXUDATIVE AGE-RELATED MACULAR DEGENERATION, BILATERAL, WITH ACTIVE CHOROIDAL NEOVASCULARIZATION: Primary | ICD-10-CM

## 2024-10-22 PROCEDURE — 99214 OFFICE O/P EST MOD 30 MIN: CPT | Performed by: OPHTHALMOLOGY

## 2024-10-22 PROCEDURE — 92134 CPTRZ OPH DX IMG PST SGM RTA: CPT | Performed by: OPHTHALMOLOGY

## 2024-10-22 PROCEDURE — 92242 FLUORESCEIN&ICG ANGIOGRAPHY: CPT | Performed by: OPHTHALMOLOGY

## 2024-10-22 ASSESSMENT — INTRAOCULAR PRESSURE
OS: 14
OD: 12

## 2024-12-09 ENCOUNTER — OFFICE VISIT (OUTPATIENT)
Dept: URBAN - METROPOLITAN AREA CLINIC 24 | Facility: CLINIC | Age: 89
End: 2024-12-09
Payer: MEDICARE

## 2024-12-09 DIAGNOSIS — H35.3231 EXUDATIVE AGE-RELATED MACULAR DEGENERATION, BILATERAL, WITH ACTIVE CHOROIDAL NEOVASCULARIZATION: Primary | ICD-10-CM

## 2024-12-09 PROCEDURE — 92134 CPTRZ OPH DX IMG PST SGM RTA: CPT | Performed by: OPHTHALMOLOGY

## 2024-12-09 ASSESSMENT — INTRAOCULAR PRESSURE
OD: 12
OS: 14

## (undated) DEVICE — GLOVE PROTEXIS BLUE W/NEU-THERA 7.5  2D73EB75

## (undated) DEVICE — SU VICRYL 3-0 SH 27" J316H

## (undated) DEVICE — LINEN TOWEL PACK X5 5464

## (undated) DEVICE — GLOVE PROTEXIS MICRO 7.5  2D73PM75

## (undated) DEVICE — DECANTER VIAL 2006S

## (undated) DEVICE — DRAPE LAP W/ARMBOARD 29410

## (undated) DEVICE — PREP CHLORAPREP 26ML TINTED ORANGE  260815

## (undated) DEVICE — DRSG STERI STRIP 1/2X4" R1547

## (undated) DEVICE — SU MONOCRYL 4-0 PS-2 18" UND Y496G

## (undated) DEVICE — DRSG TEGADERM 4X4 3/4" 1626

## (undated) DEVICE — ESU GROUND PAD UNIVERSAL W/O CORD

## (undated) DEVICE — PACK MINOR SBA15MIFSE

## (undated) DEVICE — BLADE CLIPPER 4406

## (undated) RX ORDER — HYDROCODONE BITARTRATE AND ACETAMINOPHEN 5; 325 MG/1; MG/1
TABLET ORAL
Status: DISPENSED
Start: 2017-08-02

## (undated) RX ORDER — PROPOFOL 10 MG/ML
INJECTION, EMULSION INTRAVENOUS
Status: DISPENSED
Start: 2017-08-02

## (undated) RX ORDER — FENTANYL CITRATE 50 UG/ML
INJECTION, SOLUTION INTRAMUSCULAR; INTRAVENOUS
Status: DISPENSED
Start: 2017-08-02